# Patient Record
Sex: FEMALE | Race: BLACK OR AFRICAN AMERICAN | NOT HISPANIC OR LATINO | Employment: OTHER | URBAN - METROPOLITAN AREA
[De-identification: names, ages, dates, MRNs, and addresses within clinical notes are randomized per-mention and may not be internally consistent; named-entity substitution may affect disease eponyms.]

---

## 2023-08-16 LAB
ANION GAP IN SER/PLAS: 11 MMOL/L (ref 10–20)
CALCIUM (MG/DL) IN SER/PLAS: 8.6 MG/DL (ref 8.6–10.6)
CARBON DIOXIDE, TOTAL (MMOL/L) IN SER/PLAS: 29 MMOL/L (ref 21–32)
CHLORIDE (MMOL/L) IN SER/PLAS: 105 MMOL/L (ref 98–107)
CREATININE (MG/DL) IN SER/PLAS: 1.18 MG/DL (ref 0.5–1.05)
GFR FEMALE: 50 ML/MIN/1.73M2
GLUCOSE (MG/DL) IN SER/PLAS: 242 MG/DL (ref 74–99)
MAGNESIUM (MG/DL) IN SER/PLAS: 2.19 MG/DL (ref 1.6–2.4)
POTASSIUM (MMOL/L) IN SER/PLAS: 4.4 MMOL/L (ref 3.5–5.3)
SODIUM (MMOL/L) IN SER/PLAS: 141 MMOL/L (ref 136–145)
UREA NITROGEN (MG/DL) IN SER/PLAS: 13 MG/DL (ref 6–23)

## 2023-08-31 LAB — THYROTROPIN (MIU/L) IN SER/PLAS BY DETECTION LIMIT <= 0.05 MIU/L: 0.45 MIU/L (ref 0.44–3.98)

## 2023-10-04 ENCOUNTER — PHARMACY VISIT (OUTPATIENT)
Dept: PHARMACY | Facility: CLINIC | Age: 68
End: 2023-10-04
Payer: COMMERCIAL

## 2023-10-04 PROCEDURE — RXMED WILLOW AMBULATORY MEDICATION CHARGE

## 2023-10-04 RX ORDER — GLIPIZIDE 10 MG/1
TABLET, FILM COATED, EXTENDED RELEASE ORAL
Qty: 90 TABLET | Refills: 4 | OUTPATIENT
Start: 2023-10-03

## 2023-10-14 ENCOUNTER — PHARMACY VISIT (OUTPATIENT)
Dept: PHARMACY | Facility: CLINIC | Age: 68
End: 2023-10-14
Payer: COMMERCIAL

## 2023-10-14 PROCEDURE — RXMED WILLOW AMBULATORY MEDICATION CHARGE

## 2023-10-23 ENCOUNTER — PHARMACY VISIT (OUTPATIENT)
Dept: PHARMACY | Facility: CLINIC | Age: 68
End: 2023-10-23
Payer: COMMERCIAL

## 2023-10-23 PROCEDURE — RXMED WILLOW AMBULATORY MEDICATION CHARGE

## 2023-10-24 ENCOUNTER — PHARMACY VISIT (OUTPATIENT)
Dept: PHARMACY | Facility: CLINIC | Age: 68
End: 2023-10-24
Payer: COMMERCIAL

## 2023-10-24 PROCEDURE — RXMED WILLOW AMBULATORY MEDICATION CHARGE

## 2023-10-30 ENCOUNTER — PHARMACY VISIT (OUTPATIENT)
Dept: PHARMACY | Facility: CLINIC | Age: 68
End: 2023-10-30
Payer: COMMERCIAL

## 2023-10-30 DIAGNOSIS — I50.22 CHRONIC SYSTOLIC HEART FAILURE (MULTI): Primary | ICD-10-CM

## 2023-10-30 PROCEDURE — RXMED WILLOW AMBULATORY MEDICATION CHARGE

## 2023-10-30 RX ORDER — SACUBITRIL AND VALSARTAN 49; 51 MG/1; MG/1
1 TABLET, FILM COATED ORAL 2 TIMES DAILY
COMMUNITY
End: 2023-10-31 | Stop reason: ALTCHOICE

## 2023-10-30 NOTE — TELEPHONE ENCOUNTER
Patient called for refill of Entresto.  Documented in last note when she was seen by Swati Molina NP.  Added to med list, refill request sent to Swati.

## 2023-11-07 ENCOUNTER — PHARMACY VISIT (OUTPATIENT)
Dept: PHARMACY | Facility: CLINIC | Age: 68
End: 2023-11-07
Payer: COMMERCIAL

## 2023-11-07 PROCEDURE — RXMED WILLOW AMBULATORY MEDICATION CHARGE

## 2023-11-29 ENCOUNTER — OFFICE VISIT (OUTPATIENT)
Dept: CARDIOLOGY | Facility: CLINIC | Age: 68
End: 2023-11-29
Payer: MEDICARE

## 2023-11-29 ENCOUNTER — HOSPITAL ENCOUNTER (OUTPATIENT)
Dept: CARDIOLOGY | Facility: CLINIC | Age: 68
Discharge: HOME | End: 2023-11-29
Payer: MEDICARE

## 2023-11-29 VITALS
SYSTOLIC BLOOD PRESSURE: 129 MMHG | BODY MASS INDEX: 40.84 KG/M2 | DIASTOLIC BLOOD PRESSURE: 84 MMHG | OXYGEN SATURATION: 93 % | HEIGHT: 60 IN | HEART RATE: 77 BPM | WEIGHT: 208.03 LBS

## 2023-11-29 DIAGNOSIS — I48.0 PAROXYSMAL ATRIAL FIBRILLATION (MULTI): ICD-10-CM

## 2023-11-29 DIAGNOSIS — I47.20 VENTRICULAR TACHYARRHYTHMIA (MULTI): Primary | ICD-10-CM

## 2023-11-29 PROCEDURE — 99214 OFFICE O/P EST MOD 30 MIN: CPT | Performed by: INTERNAL MEDICINE

## 2023-11-29 PROCEDURE — 1159F MED LIST DOCD IN RCRD: CPT | Performed by: INTERNAL MEDICINE

## 2023-11-29 PROCEDURE — 93005 ELECTROCARDIOGRAM TRACING: CPT

## 2023-11-29 PROCEDURE — 1036F TOBACCO NON-USER: CPT | Performed by: INTERNAL MEDICINE

## 2023-11-29 PROCEDURE — 1126F AMNT PAIN NOTED NONE PRSNT: CPT | Performed by: INTERNAL MEDICINE

## 2023-11-29 RX ORDER — ALBUTEROL SULFATE 0.83 MG/ML
SOLUTION RESPIRATORY (INHALATION) EVERY 8 HOURS PRN
COMMUNITY
Start: 2010-06-19

## 2023-11-29 RX ORDER — FLUTICASONE PROPIONATE 220 UG/1
2 AEROSOL, METERED RESPIRATORY (INHALATION) 2 TIMES DAILY
COMMUNITY
Start: 2015-04-09

## 2023-11-29 RX ORDER — AMIODARONE HYDROCHLORIDE 200 MG/1
100 TABLET ORAL DAILY
Qty: 45 TABLET | Refills: 3 | Status: SHIPPED | OUTPATIENT
Start: 2023-11-29 | End: 2024-11-28

## 2023-11-29 RX ORDER — FLUTICASONE PROPIONATE AND SALMETEROL 500; 50 UG/1; UG/1
2 POWDER RESPIRATORY (INHALATION) DAILY PRN
COMMUNITY
Start: 2016-03-17 | End: 2024-06-02 | Stop reason: ENTERED-IN-ERROR

## 2023-11-29 RX ORDER — NAPROXEN SODIUM 220 MG/1
81 TABLET, FILM COATED ORAL DAILY
COMMUNITY
Start: 2010-06-19

## 2023-11-29 ASSESSMENT — PAIN SCALES - GENERAL: PAINLEVEL: 0-NO PAIN

## 2023-11-29 NOTE — PROGRESS NOTES
Returns for follow up visit for    Chief Complaint   Patient presents with    Cardiomyopathy     Patient returns to clinic status post initiation of Amiodarone. She states that initially she experienced increased palpitations and lightheadedness but symptoms subsided since she began taking it at night. Patient denies syncope, orthopnea and chest pain/discomfort.  ANALISA Kenney RN           History Of Present Illness:    Kenna Ravi is a 68 y.o. year old female patient       Past Medical History:  Past Medical History:   Diagnosis Date    Other conditions influencing health status     Acute Myocardial Infarction       Past Surgical History:  Past Surgical History:   Procedure Laterality Date    OTHER SURGICAL HISTORY  2018    Implantable Cardioverter-Defibrillator    TONSILLECTOMY  09/15/2013    Tonsillectomy         Family History:  No family history on file. Brother  of SCD at age 58.     Allergies:  Allergies   Allergen Reactions    Iodinated Contrast Media Anaphylaxis and Shortness of breath    Iodine Anaphylaxis    Penicillins Anaphylaxis    Enoxaparin Itching, Rash and Swelling     Skin breakdown on abdomen at injection sites    Lisinopril Cough        Outpatient Medications:  Current Outpatient Medications   Medication Instructions    albuterol 2.5 mg /3 mL (0.083 %) nebulizer solution inhalation, Every 8 hours PRN    amiodarone (Pacerone) 200 mg tablet TAKE 1/2 TABLET BY MOUTH ONCE DAILY AS DIRECTED    aspirin 81 mg, oral, Daily    atorvastatin (Lipitor) 40 mg tablet TAKE 1 TABLET BY MOUTH ONCE DAILY    fluticasone (Flovent HFA) 220 mcg/actuation inhaler 2 puffs, inhalation, 2 times daily    fluticasone propion-salmeteroL (Advair Diskus) 500-50 mcg/dose diskus inhaler 2 puffs, inhalation, Daily PRN    glipiZIDE XL (Glucotrol XL) 10 mg 24 hr tablet Take 1 tablet by mouth every day    sacubitriL-valsartan (Entresto) 49-51 mg tablet 1 tablet, oral, 2 times daily    warfarin (Coumadin) 7.5 mg tablet  "TAKE 1/2 TABLET BY MOUTH ONCE DAILY EXCEPT FOR WEDNESDAYS AND SATURDAY TAKE A FULL TABLET         Last Recorded Vitals:      3/18/2020    10:20 AM 11/5/2020    10:30 AM 5/6/2021    12:59 PM 3/31/2022    10:55 AM 7/7/2022    10:46 AM 9/20/2022    11:09 AM 11/29/2023    11:52 AM   Vitals   Systolic 122 114 105 136 132 97 129   Diastolic 78 73 66 76 73 57 84   Heart Rate 72 80 80 81 68 81 77   Height (in) 1.626 m (5' 4\") 1.626 m (5' 4\") 1.626 m (5' 4\") 1.626 m (5' 4\") 1.626 m (5' 4\") 1.626 m (5' 4\") 1.534 m (5' 0.4\")   Weight (lb) 226.38 231.19  238.56 232  208.03   BMI 38.86 kg/m2 39.68 kg/m2 39.68 kg/m2 40.95 kg/m2 39.82 kg/m2 39.82 kg/m2 40.09 kg/m2   BSA (m2) 2.16 m2 2.18 m2 2.18 m2 2.21 m2 2.18 m2 2.18 m2 2.01 m2   Visit Report       Report        Physical Exam:  Physical Exam  Constitutional:       Appearance: Normal appearance.   HENT:      Head: Normocephalic.      Nose: Nose normal.   Neck:      Vascular: No carotid bruit.   Cardiovascular:      Rate and Rhythm: Normal rate and regular rhythm.      Heart sounds: Murmur heard.      No friction rub. No gallop.      Comments: Systolic murmur LLSB  Pulmonary:      Breath sounds: Normal breath sounds.   Musculoskeletal:         General: Normal range of motion.      Right lower leg: No edema.      Left lower leg: Edema present.      Comments: Tr left LLE   Skin:     General: Skin is warm and dry.   Neurological:      General: No focal deficit present.      Mental Status: She is alert and oriented to person, place, and time.   Psychiatric:         Mood and Affect: Mood normal.         Behavior: Behavior normal.          Last Cardiology Tests:  ECG:  NSR BiV paced rhythm    Echo:    TRANSTHORACIC ECHOCARDIOGRAM REPORT        Patient Name:     SHEILA JAMES        [place] Physician:  [id number] David Guillory MD  Study Date:       7/21/2023          Referring           PAIGE CASH  Physician:  LUCILLE/PID:          [id number]           PCP:  Accession/Order#: [id number]          " [facility] Non  Location:           Invasive  YOB: 1955           Fellow:             [id number] Emery Rico MD  Gender:           F                  Nurse:              Pam Faulkner RN  Admit Date:       7/20/2023          Sonographer:        Carla Beltre [facility]  Admission Status: Inpatient -        Additional Staff:   Chase Whaley  Routine                                Cardiac Sonographer  Intern  Height:           162.56 cm          CC Report to:       [address]  Weight:           97.07 kg           Study Type:         Echocardiogram  BSA:              2.01 m2  Blood Pressure: 133 /72 mmHg     Diagnosis/ICD: I50.20-Unspecified systolic (congestive) heart failure (CHF)  Indication:    CHF, ICD shock  Procedure/CPT: Echo Limited-58173; Color Doppler-42788; Doppler Limited-71159     Patient History:  Pertinent History: HFrEF (EF 30% 2019) 2/2 ICM s/p MDT CRT-D 2013, CAD s/p PCI  in LCx in 2019, VT in 2020, APLS c/b DVT/PE, PALAK on CPAP,  COPD, ICD firing, SOB, Palpitations.     Study Detail: The following Echo studies were performed: 2D, Doppler, M-Mode and  color flow. Technically challenging study due to body habitus and  poor acoustic windows. Definity used as a contrast agent for  endocardial border definition. Total contrast used for this  procedure was 2.0 mL via IV push.        PHYSICIAN INTERPRETATION:  Left Ventricle: The left ventricular systolic function is moderately decreased, with an estimated ejection fraction of 35-40%. Wall motion is abnormal. The left ventricular cavity size is severely dilated. The left ventricular septal wall thickness is moderately increased. There is severe eccentric left ventricular hypertrophy. Left ventricular diastolic filling was indeterminate. There is no definite left ventricular thrombus visualized. The posterior LV wall is akinetic and aneurysmal.  LV Wall Scoring:  The entire lateral wall and basal and mid  inferior wall are akinetic. The entire  inferior septum is hypokinetic.     Left Atrium: The left atrium is moderate to severely dilated.  Right Ventricle: The right ventricle is mildly enlarged. There is normal right ventricular global systolic function. A device is visualized in the right ventricle.  Right Atrium: The right atrium is normal in size. There is a device visualized in the right atrium.  Aortic Valve: The aortic valve is trileaflet. There is moderate aortic valve cusp calcification. There is no evidence of aortic valve regurgitation.  Mitral Valve: The mitral valve is mildly thickened. There is mild to moderate mitral valve regurgitation.  Tricuspid Valve: The tricuspid valve is structurally normal. There is trace tricuspid regurgitation. The right ventricular systolic pressure is unable to be estimated.  Pulmonic Valve: The pulmonic valve is structurally normal. There is trace pulmonic valve regurgitation.  Pericardium: There is a trivial pericardial effusion.  Aorta: The aortic root is normal. There is mild dilatation of the ascending aorta.  Systemic Veins: The inferior vena cava appears to be of normal size. There is IVC inspiratory collapse greater than 50%.  In comparison to the previous echocardiogram(s): Compared with study from 11/16/2019, no significant change.        CONCLUSIONS:  1. Left ventricular systolic function is moderately decreased with a 35-40% estimated ejection fraction.  2. Entire lateral wall, basal and mid inferior wall, and entire inferior septum are abnormal.  3. Moderately increased left ventricular septal thickness.  4. No left ventricular thrombus visualized.  5. There is severe eccentric left ventricular hypertrophy.  6. The left atrium is moderate to severely dilated.  7. Mild to moderate mitral valve regurgitation.  8. There is moderate aortic valve cusp calcification.  9. Left ventricular cavity size is severely dilated.     QUANTITATIVE DATA SUMMARY:  2D  MEASUREMENTS:  Normal Ranges:  Ao Root d:     3.60 cm    (2.0-3.7cm)  LAs:           4.20 cm    (2.7-4.0cm)  IVSd:          1.20 cm    (0.6-1.1cm)  LVPWd:         0.60 cm    (0.6-1.1cm)  LVIDd:         7.00 cm    (3.9-5.9cm)  LVIDs:         6.20 cm  LV Mass Index: 140.2 g/m2  LV % FS        11.4 %     LA VOLUME:  Normal Ranges:  LA Vol A4C:        122.5 ml   (22+/-6mL/m2)  LA Vol A2C:        105.1 ml  LA Vol BP:         114.0 ml  LA Vol Index A4C:  60.9ml/m2  LA Vol Index A2C:  52.2 ml/m2  LA Vol Index BP:   56.7 ml/m2  LA Area A4C:       31.7 cm2  LA Area A2C:       29.5 cm2  LA Major Axis A4C: 7.0 cm  LA Major Axis A2C: 7.0 cm  LA Volume Index:   56.7 ml/m2     RA VOLUME BY A/L METHOD:  Normal Ranges:  RA Area A4C: 16.2 cm2     AORTA MEASUREMENTS:  Normal Ranges:  Asc Ao, d: 3.70 cm (2.1-3.4cm)     LV SYSTOLIC FUNCTION BY 2D PLANIMETRY (MOD):  Normal Ranges:  EF-A4C View: 37.0 % (>=55%)  EF-A2C View: 36.2 %  EF-Biplane:  37.3 %     MITRAL INSUFFICIENCY:  Normal Ranges:  MR VTI:  221.00 cm  MR Vmax: 545.00 cm/s     AORTIC VALVE:  Normal Ranges:  LVOT Diameter: 2.20 cm (1.8-2.4cm)        RIGHT VENTRICLE:  RV Basal 4.30 cm  RV Mid   2.30 cm  RV Major 7.4 cm  TAPSE:   22.6 mm     TRICUSPID VALVE/RVSP:  Normal Ranges:  IVC Diam: 1.04 cm        [id number] David Guillory MD  Electronically signed on 7/21/2023 at 11:21:12 AM       Cardiac Device Check 9/26/23 :  Battery status 1 yr 10 mos projected  Effective BiV pacing 98%  No sig A or V arrhythmias.    LFTs 7/2023 normal  TSH 8/2023   0.45       Assessment/Plan   Problem List Items Addressed This Visit    None  Visit Diagnoses         Codes    Ventricular tachyarrhythmia (CMS/HCC)    -  Primary I47.20    Relevant Medications    amiodarone (Pacerone) 100 mg tablet    Other Relevant Orders    ECG 12 lead (Clinic Performed)    Comprehensive metabolic panel    Paroxysmal atrial fibrillation (CMS/HCC)     I48.0        Continue amiodarone - no further VT episodes. No AF.  Change  formulation to 100 mg at night and continue warfarin for stroke risk reduction.  Return in May for in clinic check.  She will have CMP done with next blood draw.  Remote every 3 months for CRT check.     Peggy Rocha MD

## 2023-11-29 NOTE — PATIENT INSTRUCTIONS
It was nice to see you today!  We changed your prescription to amiodarone 100 mg tablets so you will take one a night. (Pacerone)  Please have the blood workdone when you re able for us to check your liver function on the amiodarone.  Your next follow up for the amiodarone should be in May 2023 -  with Sujey Guillory CNP.   Remote device check is do at end of December and is remote.    Schedule hte in clinic device check for May as well.

## 2023-11-30 ENCOUNTER — TELEPHONE (OUTPATIENT)
Dept: CARDIOLOGY | Facility: CLINIC | Age: 68
End: 2023-11-30
Payer: MEDICARE

## 2023-11-30 NOTE — TELEPHONE ENCOUNTER
Spoke with Kenna Ravi and informed her 100 mg dose of amiodarone is not covered by her insurance so Dr. Rocha renewed the Rx at the 200 mg dose and she will have to continue to cut them in half. Patoent expressed understanding.

## 2023-12-04 PROBLEM — R06.5 XEROSTOMIA DUE TO MOUTH BREATHING: Status: ACTIVE | Noted: 2023-12-04

## 2023-12-04 PROBLEM — E78.5 HYPERLIPIDEMIA: Status: ACTIVE | Noted: 2023-12-04

## 2023-12-04 PROBLEM — O03.9 ABORTION (HHS-HCC): Status: ACTIVE | Noted: 2023-12-04

## 2023-12-04 PROBLEM — E11.9 DIABETES MELLITUS (MULTI): Status: ACTIVE | Noted: 2023-12-04

## 2023-12-04 PROBLEM — I10 ESSENTIAL HYPERTENSION: Status: ACTIVE | Noted: 2023-12-04

## 2023-12-04 PROBLEM — N28.9 ACUTE RENAL INSUFFICIENCY: Status: ACTIVE | Noted: 2023-12-04

## 2023-12-04 PROBLEM — I42.9 CARDIOMYOPATHY (MULTI): Status: ACTIVE | Noted: 2023-12-04

## 2023-12-04 PROBLEM — Z95.5 PRESENCE OF STENT IN CORONARY ARTERY: Status: ACTIVE | Noted: 2023-12-04

## 2023-12-04 PROBLEM — R00.2 PALPITATIONS: Status: ACTIVE | Noted: 2023-12-04

## 2023-12-04 PROBLEM — Z95.810 PRESENCE OF AUTOMATIC (IMPLANTABLE) CARDIAC DEFIBRILLATOR: Status: ACTIVE | Noted: 2023-04-06

## 2023-12-04 PROBLEM — E03.9 HYPOTHYROIDISM: Status: ACTIVE | Noted: 2023-12-04

## 2023-12-04 PROBLEM — J44.9 COPD (CHRONIC OBSTRUCTIVE PULMONARY DISEASE) (MULTI): Status: ACTIVE | Noted: 2023-12-04

## 2023-12-04 PROBLEM — I82.409 DEEP VEIN THROMBOSIS OF LOWER EXTREMITY (MULTI): Status: ACTIVE | Noted: 2023-12-04

## 2023-12-04 PROBLEM — I47.20 VENTRICULAR TACHYCARDIA (MULTI): Status: ACTIVE | Noted: 2023-12-04

## 2023-12-04 PROBLEM — I25.10 CORONARY ATHEROSCLEROSIS OF NATIVE CORONARY ARTERY: Status: ACTIVE | Noted: 2023-12-04

## 2023-12-04 PROBLEM — I50.22 CHRONIC SYSTOLIC CONGESTIVE HEART FAILURE (MULTI): Status: ACTIVE | Noted: 2023-12-04

## 2023-12-04 PROBLEM — I26.99 PULMONARY ARTERY THROMBOSIS (MULTI): Status: ACTIVE | Noted: 2023-12-04

## 2023-12-04 PROBLEM — I21.4 NSTEMI (NON-ST ELEVATED MYOCARDIAL INFARCTION) (MULTI): Status: ACTIVE | Noted: 2018-08-23

## 2023-12-04 PROBLEM — D64.9 ANEMIA: Status: ACTIVE | Noted: 2023-12-04

## 2023-12-04 PROBLEM — D68.61 ANTIPHOSPHOLIPID ANTIBODY SYNDROME (MULTI): Status: ACTIVE | Noted: 2023-12-04

## 2023-12-04 PROBLEM — G47.33 OBSTRUCTIVE SLEEP APNEA: Status: ACTIVE | Noted: 2023-12-04

## 2023-12-04 PROBLEM — N18.9 CKD (CHRONIC KIDNEY DISEASE): Status: ACTIVE | Noted: 2023-12-04

## 2023-12-04 PROBLEM — R07.89 ATYPICAL CHEST PAIN: Status: ACTIVE | Noted: 2023-12-04

## 2023-12-04 PROBLEM — K11.7 XEROSTOMIA DUE TO MOUTH BREATHING: Status: ACTIVE | Noted: 2023-12-04

## 2023-12-05 ENCOUNTER — OFFICE VISIT (OUTPATIENT)
Dept: CARDIOLOGY | Facility: HOSPITAL | Age: 68
End: 2023-12-05
Payer: MEDICARE

## 2023-12-05 ENCOUNTER — PHARMACY VISIT (OUTPATIENT)
Dept: PHARMACY | Facility: CLINIC | Age: 68
End: 2023-12-05
Payer: COMMERCIAL

## 2023-12-05 VITALS
BODY MASS INDEX: 36.26 KG/M2 | SYSTOLIC BLOOD PRESSURE: 147 MMHG | DIASTOLIC BLOOD PRESSURE: 82 MMHG | OXYGEN SATURATION: 91 % | HEART RATE: 97 BPM | HEIGHT: 64 IN | WEIGHT: 212.4 LBS

## 2023-12-05 DIAGNOSIS — I50.22 CHRONIC SYSTOLIC CONGESTIVE HEART FAILURE (MULTI): Primary | ICD-10-CM

## 2023-12-05 DIAGNOSIS — E78.5 HYPERLIPIDEMIA, UNSPECIFIED HYPERLIPIDEMIA TYPE: Primary | ICD-10-CM

## 2023-12-05 LAB
ATRIAL RATE: 77 BPM
P AXIS: 67 DEGREES
P OFFSET: 143 MS
P ONSET: 123 MS
PR INTERVAL: 136 MS
Q ONSET: 176 MS
QRS COUNT: 13 BEATS
QRS DURATION: 180 MS
QT INTERVAL: 508 MS
QTC CALCULATION(BAZETT): 574 MS
QTC FREDERICIA: 552 MS
R AXIS: -80 DEGREES
T AXIS: 95 DEGREES
T OFFSET: 430 MS
VENTRICULAR RATE: 77 BPM

## 2023-12-05 PROCEDURE — 3066F NEPHROPATHY DOC TX: CPT | Performed by: INTERNAL MEDICINE

## 2023-12-05 PROCEDURE — 3077F SYST BP >= 140 MM HG: CPT | Performed by: INTERNAL MEDICINE

## 2023-12-05 PROCEDURE — 1126F AMNT PAIN NOTED NONE PRSNT: CPT | Performed by: INTERNAL MEDICINE

## 2023-12-05 PROCEDURE — 99215 OFFICE O/P EST HI 40 MIN: CPT | Performed by: INTERNAL MEDICINE

## 2023-12-05 PROCEDURE — RXMED WILLOW AMBULATORY MEDICATION CHARGE

## 2023-12-05 PROCEDURE — 1159F MED LIST DOCD IN RCRD: CPT | Performed by: INTERNAL MEDICINE

## 2023-12-05 PROCEDURE — 3079F DIAST BP 80-89 MM HG: CPT | Performed by: INTERNAL MEDICINE

## 2023-12-05 PROCEDURE — 3052F HG A1C>EQUAL 8.0%<EQUAL 9.0%: CPT | Performed by: INTERNAL MEDICINE

## 2023-12-05 PROCEDURE — 1036F TOBACCO NON-USER: CPT | Performed by: INTERNAL MEDICINE

## 2023-12-05 PROCEDURE — 1160F RVW MEDS BY RX/DR IN RCRD: CPT | Performed by: INTERNAL MEDICINE

## 2023-12-05 RX ORDER — FUROSEMIDE 40 MG/1
40 TABLET ORAL
COMMUNITY
End: 2023-12-13

## 2023-12-05 RX ORDER — DAPAGLIFLOZIN 5 MG/1
5 TABLET, FILM COATED ORAL DAILY
Qty: 90 EACH | Refills: 3 | Status: SHIPPED | OUTPATIENT
Start: 2023-12-05 | End: 2023-12-19 | Stop reason: SDUPTHER

## 2023-12-05 RX ORDER — ATORVASTATIN CALCIUM 40 MG/1
40 TABLET, FILM COATED ORAL DAILY
Qty: 30 TABLET | Refills: 11 | Status: SHIPPED | OUTPATIENT
Start: 2023-12-05 | End: 2024-12-04

## 2023-12-05 RX ORDER — CARVEDILOL 3.12 MG/1
3.12 TABLET ORAL
Qty: 180 TABLET | Refills: 2 | Status: SHIPPED | OUTPATIENT
Start: 2023-12-05 | End: 2024-09-01

## 2023-12-05 ASSESSMENT — PATIENT HEALTH QUESTIONNAIRE - PHQ9
1. LITTLE INTEREST OR PLEASURE IN DOING THINGS: NOT AT ALL
SUM OF ALL RESPONSES TO PHQ9 QUESTIONS 1 AND 2: 0
2. FEELING DOWN, DEPRESSED OR HOPELESS: NOT AT ALL

## 2023-12-05 ASSESSMENT — PAIN SCALES - GENERAL: PAINLEVEL: 0-NO PAIN

## 2023-12-05 NOTE — PROGRESS NOTES
Chief Complaint    SHEILA JAMES is here for a follow up visit     History of Present Illness  68 year old woman with the PMH significant for  HFrEF , CAD s/p PCI to her Lcx in 2019 , s/p CRT in 2019 , antiphospholipid Ab syndrome c/b DVT/PE s/p [place] filter '98, hypothyroid, PALAK on CPAP, CKD, NVST and VT who I last saw in 2022 but who has been following with Swati Molina regularly .   She was admitted in August 2023 with Vfib and ICD firing . During this admission, echo was stable from last with LVEF 30% and V/Q scan negative for pulmonary embolism. Patient was unable to complete cardiac MRI due to claustrophobia. [facility] on 7/31/23, which showed non-obstructive coronary artery disease.   She is here for a follow up visit   Overall doing well     [facility] ( August 2023 )   Non obstructive CAD     Echocardiogram ( July 2023)   1. Left ventricular systolic function is moderately decreased with a 35-40% estimated ejection fraction.  2. Entire lateral wall, basal and mid inferior wall, and entire inferior septum are abnormal.  3. Moderately increased left ventricular septal thickness.  4. No left ventricular thrombus visualized.  5. There is severe eccentric left ventricular hypertrophy.  6. The left atrium is moderate to severely dilated.  7. Mild to moderate mitral valve regurgitation.  8. There is moderate aortic valve cusp calcification.  9. Left ventricular cavity size is severely dilated.    Vitals:    12/05/23 0931   BP: 147/82   Pulse: 97   SpO2: 91%        On Physical exam   Gen : NAD , Aox3  HEENT : JVP at around 1 cm above the clavicle   Heart : regular , no mumurs   Lungs :Clear resonant , normal air entry bilaterally  Abdomen : soft , non tender   Ext : warm , well perfused ,+1 pitting edema bilaterally     Current Outpatient Medications   Medication Instructions    albuterol 2.5 mg /3 mL (0.083 %) nebulizer solution inhalation, Every 8 hours PRN    amiodarone (PACERONE) 100 mg, oral, Daily    aspirin 81 mg,  oral, Daily    atorvastatin (LIPITOR) 40 mg, oral, Daily    carvedilol (COREG) 3.125 mg, oral, 2 times daily with meals    dapagliflozin propanediol (FARXIGA) 5 mg, oral, Daily    fluticasone (Flovent HFA) 220 mcg/actuation inhaler 2 puffs, inhalation, 2 times daily    fluticasone propion-salmeteroL (Advair Diskus) 500-50 mcg/dose diskus inhaler 2 puffs, inhalation, Daily PRN    furosemide (LASIX) 40 mg, oral, Daily    glipiZIDE XL (Glucotrol XL) 10 mg 24 hr tablet Take 1 tablet by mouth every day    sacubitriL-valsartan (Entresto) 49-51 mg tablet 1 tablet, oral, 2 times daily    warfarin (Coumadin) 7.5 mg tablet TAKE 1/2 TABLET BY MOUTH ONCE DAILY EXCEPT FOR WEDNESDAYS AND SATURDAY TAKE A FULL TABLET            A/P  68 year old woman with the PMH significant for  HFrEF , CAD s/p PCI to her Lcx in 2019 , s/p CRT in 2019 , antiphospholipid Ab syndrome c/b DVT/PE s/p [place] filter '98, hypothyroid, PALAK on CPAP, CKD, NVST and VT who I last saw in 2022 but who has been following with Swati Molina regularly .   She was admitted in August 2023 with Vfib and ICD firing . During this admission, echo was stable from last with LVEF 30% and V/Q scan negative for pulmonary embolism. Patient was unable to complete cardiac MRI due to claustrophobia. [facility] on 7/31/23, which showed non-obstructive coronary artery disease.   She is here for a follow up visit     #HFrEF  Will resume carvedilol at 3.125 mg BID   Add jardiance 5 mg daily   Mildly hypervolemic but has not taken lasix yet , will reassess her volume after initiation of jardiance     #history of VT and Vfib with ICD firing   On amidarone 100 mg daily   Follows with Dr Rocha     #CAD s/p PCI to the Lcx in 2019   Non obstructive CAD on her most recent LHC from August 2023   Continue ASA and Statin     #PE and DVT s/p IVC filter in the setting of antiphospholipid antibodies   Continue anticoagulation     Obtain a renal function panel and follow up with Swati Molina in 2  weeks

## 2023-12-05 NOTE — PATIENT INSTRUCTIONS
To reach Dr. Chery's office please call [phone] ([place]). Fax [phone]. Call [phone] to schedule an appointment. You may also contact the HF RNs at [email or web address]  (Please include your name and date of birth)    START Carvedilol 3.125mg twice daily  START Farxiga 5mg Daily- please monitor your blood sugar   Please schedule a follow up with Swati Molina in 2 weeks   Please have lab work before appointment with Swati

## 2023-12-06 ENCOUNTER — PHARMACY VISIT (OUTPATIENT)
Dept: PHARMACY | Facility: CLINIC | Age: 68
End: 2023-12-06

## 2023-12-12 DIAGNOSIS — I50.22 CHRONIC SYSTOLIC CONGESTIVE HEART FAILURE (MULTI): Primary | ICD-10-CM

## 2023-12-12 RX ORDER — OMEPRAZOLE 20 MG/1
20 CAPSULE, DELAYED RELEASE ORAL DAILY
Qty: 90 CAPSULE | Refills: 0 | OUTPATIENT
Start: 2023-12-12

## 2023-12-13 RX ORDER — FUROSEMIDE 40 MG/1
40 TABLET ORAL DAILY
Qty: 90 TABLET | Refills: 3 | Status: SHIPPED | OUTPATIENT
Start: 2023-12-13 | End: 2024-12-12

## 2023-12-18 ENCOUNTER — OFFICE VISIT (OUTPATIENT)
Dept: CARDIOLOGY | Facility: HOSPITAL | Age: 68
End: 2023-12-18
Payer: MEDICARE

## 2023-12-18 VITALS
BODY MASS INDEX: 36.47 KG/M2 | DIASTOLIC BLOOD PRESSURE: 89 MMHG | WEIGHT: 213.6 LBS | HEART RATE: 85 BPM | HEIGHT: 64 IN | SYSTOLIC BLOOD PRESSURE: 148 MMHG | OXYGEN SATURATION: 93 %

## 2023-12-18 DIAGNOSIS — I50.22 CHRONIC SYSTOLIC CONGESTIVE HEART FAILURE (MULTI): ICD-10-CM

## 2023-12-18 PROCEDURE — 99214 OFFICE O/P EST MOD 30 MIN: CPT | Performed by: NURSE PRACTITIONER

## 2023-12-18 PROCEDURE — 3079F DIAST BP 80-89 MM HG: CPT | Performed by: NURSE PRACTITIONER

## 2023-12-18 PROCEDURE — 3052F HG A1C>EQUAL 8.0%<EQUAL 9.0%: CPT | Performed by: NURSE PRACTITIONER

## 2023-12-18 PROCEDURE — 1159F MED LIST DOCD IN RCRD: CPT | Performed by: NURSE PRACTITIONER

## 2023-12-18 PROCEDURE — 3077F SYST BP >= 140 MM HG: CPT | Performed by: NURSE PRACTITIONER

## 2023-12-18 PROCEDURE — 1160F RVW MEDS BY RX/DR IN RCRD: CPT | Performed by: NURSE PRACTITIONER

## 2023-12-18 PROCEDURE — 1126F AMNT PAIN NOTED NONE PRSNT: CPT | Performed by: NURSE PRACTITIONER

## 2023-12-18 PROCEDURE — 3066F NEPHROPATHY DOC TX: CPT | Performed by: NURSE PRACTITIONER

## 2023-12-18 PROCEDURE — 1036F TOBACCO NON-USER: CPT | Performed by: NURSE PRACTITIONER

## 2023-12-18 ASSESSMENT — ENCOUNTER SYMPTOMS
LIGHT-HEADEDNESS: 0
SHORTNESS OF BREATH: 0
IRREGULAR HEARTBEAT: 0
BRUISES/BLEEDS EASILY: 0
HEMATOCHEZIA: 0
WEIGHT LOSS: 0
ANOREXIA: 0
DIARRHEA: 0
ORTHOPNEA: 0
NEAR-SYNCOPE: 0
DECREASED APPETITE: 0
PND: 0
SLEEP DISTURBANCES DUE TO BREATHING: 0
SYNCOPE: 0
NAUSEA: 0
PALPITATIONS: 0
VOMITING: 0
DYSPNEA ON EXERTION: 0
WEIGHT GAIN: 0
DIZZINESS: 0
HEMATURIA: 0

## 2023-12-18 ASSESSMENT — PATIENT HEALTH QUESTIONNAIRE - PHQ9
2. FEELING DOWN, DEPRESSED OR HOPELESS: NOT AT ALL
SUM OF ALL RESPONSES TO PHQ9 QUESTIONS 1 AND 2: 0
1. LITTLE INTEREST OR PLEASURE IN DOING THINGS: NOT AT ALL

## 2023-12-18 ASSESSMENT — PAIN SCALES - GENERAL: PAINLEVEL: 0-NO PAIN

## 2023-12-18 NOTE — PATIENT INSTRUCTIONS
& start Farxiga 5mg every day from [facility].   Please have labs drawn in 7-10 days after you start medication change. We will call you with any abnormal results.   You were previously referred for cardiac rehabilitation.  If you are not contacted to schedule appointment in the next 2-3 business days, please call to schedule the appointment: [place]: [phone]  Continue current medications as ordered.   Call [phone] to schedule 1 month follow up with Swati Molina.

## 2023-12-18 NOTE — PROGRESS NOTES
"Subjective     Chief Complaint:  69yo patient of Dr. Jun Rascon here for 2 week follow up for heart failure.     HPI  Most recent visit 12/5/23, start Carvedilol 3.125mg BID and Farxiga 5mg every day. Order for RFP/BNP/INR.     PMHx: HTN with severe LVH, HLD, DM2, HFrEF 2/2 ICM (initially NICM but had MI 20 yrs ago) s/p MDT CRT-D 2/11/29, CAD/ remote MI '98, [facility] 7/2019 s/p PCI to LCx, COPD, antiphospholipid Ab syndrome c/b DVT/PE s/p [place] filter '98, hypothyroid, PALAK on CPAP, CKD, NVST & MMVT treated with ATP  Social hx: Former smoker, quit 2010. Denies EtoH/illicit drug use.      Ambulates with cane. Denies chest pain. Denies palpitations. Denies SOB on exertion. Activity limited by BLE weakness & balance issues per patient.  Denies orthopnea/PND. Adherent w/CPAP. Denies lightheadedness, dizziness, and syncope. Denies edema. Medication nonadherent- has not received Farxiga. Decreased appetite. Denies N/V/D. Home -120s/60-70s. Weight stable.     Review of Systems   Constitutional: Negative for decreased appetite, weight gain and weight loss.   HENT:  Negative for nosebleeds.    Cardiovascular:  Negative for chest pain, dyspnea on exertion, irregular heartbeat, leg swelling, near-syncope, orthopnea, palpitations, paroxysmal nocturnal dyspnea and syncope.   Respiratory:  Negative for shortness of breath and sleep disturbances due to breathing.    Hematologic/Lymphatic: Negative for bleeding problem. Does not bruise/bleed easily.   Musculoskeletal:  Positive for muscle weakness.   Gastrointestinal:  Negative for anorexia, diarrhea, hematochezia, nausea and vomiting.   Genitourinary:  Negative for hematuria.   Neurological:  Negative for dizziness and light-headedness.       Objective   Visit Vitals  /89 (BP Location: Left arm, Patient Position: Sitting, BP Cuff Size: Adult)   Pulse 85   Ht 1.626 m (5' 4\")   Wt 96.9 kg (213 lb 9.6 oz)   SpO2 93%   BMI 36.66 kg/m²   Smoking Status Former   BSA 2.09 m² "       Vitals reviewed.   Constitutional:       Appearance: Obese.   Neck:      Vascular: No hepatojugular reflux or JVD. JVD normal.   Pulmonary:      Effort: Pulmonary effort is normal.      Breath sounds: Normal breath sounds.   Cardiovascular:      Normal rate. Regular rhythm.      Murmurs: There is no murmur.      No gallop.  No click. No rub.   Edema:     Peripheral edema present.     Ankle: bilateral trace edema of the ankle.     Feet: bilateral trace edema of the feet.  Abdominal:      General: There is no distension.   Skin:     General: Skin is warm and dry.   Neurological:      Mental Status: Alert and oriented to person, place and time.       Lab Review:   Lab Results   Component Value Date     08/16/2023    K 4.4 08/16/2023     08/16/2023    CO2 29 08/16/2023    BUN 13 08/16/2023    CREATININE 1.18 (H) 08/16/2023    GLUCOSE 242 (H) 08/16/2023    CALCIUM 8.6 08/16/2023     Lab Results   Component Value Date    WBC 8.0 08/01/2023    HGB 11.1 (L) 08/01/2023    HCT 36.3 08/01/2023    MCV 87 08/01/2023     08/01/2023       Current Outpatient Medications:     albuterol 2.5 mg /3 mL (0.083 %) nebulizer solution, Inhale every 8 hours if needed for wheezing., Disp: , Rfl:     amiodarone (Pacerone) 200 mg tablet, Take 0.5 tablets (100 mg) by mouth once daily., Disp: 45 tablet, Rfl: 3    aspirin 81 mg chewable tablet, Chew 1 tablet (81 mg) once daily., Disp: , Rfl:     atorvastatin (Lipitor) 40 mg tablet, Take 1 tablet (40 mg) by mouth once daily., Disp: 30 tablet, Rfl: 11    carvedilol (Coreg) 3.125 mg tablet, Take 1 tablet (3.125 mg) by mouth 2 times a day with meals., Disp: 180 tablet, Rfl: 2    dapagliflozin propanediol (Farxiga) 5 mg, Take 1 tablet (5 mg) by mouth once daily., Disp: 90 each, Rfl: 3    fluticasone (Flovent HFA) 220 mcg/actuation inhaler, Inhale 2 puffs 2 times a day., Disp: , Rfl:     fluticasone propion-salmeteroL (Advair Diskus) 500-50 mcg/dose diskus inhaler, Inhale 2  puffs once daily as needed., Disp: , Rfl:     furosemide (Lasix) 40 mg tablet, Take 1 tablet (40 mg) by mouth once daily., Disp: 90 tablet, Rfl: 3    glipiZIDE XL (Glucotrol XL) 10 mg 24 hr tablet, Take 1 tablet by mouth every day, Disp: 90 tablet, Rfl: 4    sacubitriL-valsartan (Entresto) 49-51 mg tablet, Take 1 tablet by mouth 2 times a day., Disp: 180 tablet, Rfl: 0    warfarin (Coumadin) 7.5 mg tablet, TAKE 1/2 TABLET BY MOUTH ONCE DAILY EXCEPT FOR WEDNESDAYS AND SATURDAY TAKE A FULL TABLET, Disp: 30 tablet, Rfl: 2      Assessment/Plan   Systolic Heart Failure  Trace nonpitting BLE edema.  Appears euvolemic and normotensive on exam today. TTE 7/2023 LVEF 35-40% w/severely dilated LV, severe LVH. [facility] 7/2023 mid RCA w/40% stenosis  & 30% LAD stenosis w/negative FFR, no PCI. NYHA Class III Stage C HFrEF 2/2 NICM s/p CRT-D. (Not a candidate for CMEMS 2/2 DVT/PE hx.) Continue Carvediol 3.125mg BID, Entresto 49-51mg BID (allergy to ACEI), Farxiga 5mg every day (requesting transfer of Rx to [facility]), and Lasix 40mg QD. No MRA 2/2 intermittent hyperkalemia (5.4). Previous order for RFP/BNP in 7-10 days. Previously referred to cardiac rehab on 12/5/23.      VT  Asymptomatic. Continue Amiodarone 100mg every day. Denies shocks. Follows w/EP.      CAD  Denies active s/s of ACS. [facility] 2023 nonobstructive CAD. Continue Aspirin 81mg QD and Atorvastatin 40mg QHS.     Antiphospholipid c/b DVT/PE s/p IVC filter  Continue Warfarin. Denies active s/s of bleeding. [facility]s w/[facility]. Order for INR.

## 2023-12-19 PROCEDURE — RXMED WILLOW AMBULATORY MEDICATION CHARGE

## 2023-12-19 RX ORDER — DAPAGLIFLOZIN 5 MG/1
5 TABLET, FILM COATED ORAL DAILY
Qty: 90 TABLET | Refills: 3 | Status: SHIPPED | OUTPATIENT
Start: 2023-12-19 | End: 2024-12-18

## 2023-12-20 ENCOUNTER — LAB (OUTPATIENT)
Dept: LAB | Facility: LAB | Age: 68
End: 2023-12-20
Payer: MEDICARE

## 2023-12-20 ENCOUNTER — PHARMACY VISIT (OUTPATIENT)
Dept: PHARMACY | Facility: CLINIC | Age: 68
End: 2023-12-20
Payer: COMMERCIAL

## 2023-12-20 DIAGNOSIS — I50.22 CHRONIC SYSTOLIC CONGESTIVE HEART FAILURE (MULTI): ICD-10-CM

## 2023-12-20 DIAGNOSIS — I47.20 VENTRICULAR TACHYARRHYTHMIA (MULTI): ICD-10-CM

## 2023-12-20 LAB
ALBUMIN SERPL BCP-MCNC: 3.4 G/DL (ref 3.4–5)
ALP SERPL-CCNC: 72 U/L (ref 33–136)
ALT SERPL W P-5'-P-CCNC: 7 U/L (ref 7–45)
ANION GAP SERPL CALC-SCNC: 11 MMOL/L (ref 10–20)
AST SERPL W P-5'-P-CCNC: 11 U/L (ref 9–39)
BILIRUB SERPL-MCNC: 0.4 MG/DL (ref 0–1.2)
BUN SERPL-MCNC: 13 MG/DL (ref 6–23)
CALCIUM SERPL-MCNC: 8.1 MG/DL (ref 8.6–10.6)
CHLORIDE SERPL-SCNC: 104 MMOL/L (ref 98–107)
CO2 SERPL-SCNC: 30 MMOL/L (ref 21–32)
CREAT SERPL-MCNC: 1.56 MG/DL (ref 0.5–1.05)
GFR SERPL CREATININE-BSD FRML MDRD: 36 ML/MIN/1.73M*2
GLUCOSE SERPL-MCNC: 168 MG/DL (ref 74–99)
PHOSPHATE SERPL-MCNC: 3 MG/DL (ref 2.5–4.9)
POTASSIUM SERPL-SCNC: 4.5 MMOL/L (ref 3.5–5.3)
PROT SERPL-MCNC: 6.1 G/DL (ref 6.4–8.2)
SODIUM SERPL-SCNC: 140 MMOL/L (ref 136–145)

## 2023-12-20 PROCEDURE — 80053 COMPREHEN METABOLIC PANEL: CPT

## 2023-12-20 PROCEDURE — 84100 ASSAY OF PHOSPHORUS: CPT

## 2023-12-20 PROCEDURE — 83880 ASSAY OF NATRIURETIC PEPTIDE: CPT

## 2023-12-20 PROCEDURE — 36415 COLL VENOUS BLD VENIPUNCTURE: CPT

## 2023-12-22 LAB — BNP SERPL-MCNC: 238 PG/ML (ref 0–99)

## 2023-12-27 PROCEDURE — RXMED WILLOW AMBULATORY MEDICATION CHARGE

## 2023-12-28 ENCOUNTER — TELEPHONE (OUTPATIENT)
Dept: CARDIOLOGY | Facility: CLINIC | Age: 68
End: 2023-12-28
Payer: MEDICARE

## 2023-12-28 ENCOUNTER — PHARMACY VISIT (OUTPATIENT)
Dept: PHARMACY | Facility: CLINIC | Age: 68
End: 2023-12-28
Payer: COMMERCIAL

## 2023-12-28 NOTE — TELEPHONE ENCOUNTER
Spoke with patient and informed her that Dr. Rocha reviewed the results of her blood work and they are acceptable. Patient verbalized understanding.

## 2024-01-23 DIAGNOSIS — I50.22 CHRONIC SYSTOLIC HEART FAILURE (MULTI): ICD-10-CM

## 2024-01-26 PROCEDURE — RXMED WILLOW AMBULATORY MEDICATION CHARGE

## 2024-01-27 ENCOUNTER — PHARMACY VISIT (OUTPATIENT)
Dept: PHARMACY | Facility: CLINIC | Age: 69
End: 2024-01-27
Payer: COMMERCIAL

## 2024-01-30 RX ORDER — SACUBITRIL AND VALSARTAN 49; 51 MG/1; MG/1
1 TABLET, FILM COATED ORAL 2 TIMES DAILY
Qty: 180 TABLET | Refills: 0 | Status: SHIPPED | OUTPATIENT
Start: 2024-01-30 | End: 2024-04-02 | Stop reason: SDUPTHER

## 2024-01-31 PROCEDURE — RXMED WILLOW AMBULATORY MEDICATION CHARGE

## 2024-02-01 ENCOUNTER — PHARMACY VISIT (OUTPATIENT)
Dept: PHARMACY | Facility: CLINIC | Age: 69
End: 2024-02-01
Payer: COMMERCIAL

## 2024-02-10 PROCEDURE — RXMED WILLOW AMBULATORY MEDICATION CHARGE

## 2024-02-12 ENCOUNTER — PHARMACY VISIT (OUTPATIENT)
Dept: PHARMACY | Facility: CLINIC | Age: 69
End: 2024-02-12
Payer: COMMERCIAL

## 2024-02-13 ENCOUNTER — PHARMACY VISIT (OUTPATIENT)
Dept: PHARMACY | Facility: CLINIC | Age: 69
End: 2024-02-13

## 2024-02-14 RX ORDER — WARFARIN 7.5 MG/1
TABLET ORAL
Qty: 30 TABLET | Refills: 2 | OUTPATIENT
Start: 2024-02-14 | End: 2025-01-01

## 2024-02-15 RX ORDER — WARFARIN 7.5 MG/1
TABLET ORAL
Qty: 30 TABLET | Refills: 2 | Status: CANCELLED | OUTPATIENT
Start: 2024-02-15 | End: 2025-02-14

## 2024-02-29 PROCEDURE — RXMED WILLOW AMBULATORY MEDICATION CHARGE

## 2024-03-01 ENCOUNTER — PHARMACY VISIT (OUTPATIENT)
Dept: PHARMACY | Facility: CLINIC | Age: 69
End: 2024-03-01
Payer: COMMERCIAL

## 2024-03-01 PROCEDURE — RXMED WILLOW AMBULATORY MEDICATION CHARGE

## 2024-03-01 RX ORDER — WARFARIN 7.5 MG/1
TABLET ORAL
Qty: 30 TABLET | Refills: 11 | OUTPATIENT
Start: 2024-03-01 | End: 2024-06-03 | Stop reason: HOSPADM

## 2024-03-04 ENCOUNTER — PHARMACY VISIT (OUTPATIENT)
Dept: PHARMACY | Facility: CLINIC | Age: 69
End: 2024-03-04
Payer: COMMERCIAL

## 2024-03-21 ENCOUNTER — HOSPITAL ENCOUNTER (OUTPATIENT)
Dept: RADIOLOGY | Facility: CLINIC | Age: 69
Discharge: HOME | End: 2024-03-21
Payer: MEDICARE

## 2024-03-21 DIAGNOSIS — Z12.31 ENCOUNTER FOR SCREENING MAMMOGRAM FOR MALIGNANT NEOPLASM OF BREAST: ICD-10-CM

## 2024-03-21 PROCEDURE — 77063 BREAST TOMOSYNTHESIS BI: CPT | Performed by: RADIOLOGY

## 2024-03-21 PROCEDURE — 77067 SCR MAMMO BI INCL CAD: CPT | Performed by: RADIOLOGY

## 2024-03-21 PROCEDURE — 77067 SCR MAMMO BI INCL CAD: CPT

## 2024-03-26 PROCEDURE — RXMED WILLOW AMBULATORY MEDICATION CHARGE

## 2024-03-27 ENCOUNTER — PHARMACY VISIT (OUTPATIENT)
Dept: PHARMACY | Facility: CLINIC | Age: 69
End: 2024-03-27
Payer: COMMERCIAL

## 2024-04-02 ENCOUNTER — TELEPHONE (OUTPATIENT)
Dept: CARDIOLOGY | Facility: HOSPITAL | Age: 69
End: 2024-04-02

## 2024-04-02 DIAGNOSIS — I50.22 CHRONIC SYSTOLIC HEART FAILURE (MULTI): ICD-10-CM

## 2024-04-02 RX ORDER — SACUBITRIL AND VALSARTAN 49; 51 MG/1; MG/1
1 TABLET, FILM COATED ORAL 2 TIMES DAILY
Qty: 180 TABLET | Refills: 2 | Status: SHIPPED | OUTPATIENT
Start: 2024-04-02 | End: 2024-12-28

## 2024-04-02 NOTE — TELEPHONE ENCOUNTER
CRM # [id number]  Owner: None  Status: Unresolved  Priority: High Created on: 04/01/2024 02:15 PM By: Montse Welch     Primary Information    Source   Kenna Ravi (Patient)    Subject   Kenna Ravi (Patient)    Topic   Information Request - Prescription Refill FAQ      Summary   pt needs a prescription refill on  sacubitriL-valsartan (Entresto) 49-51 mg tablet   Communication   Prescription refill needed

## 2024-04-02 NOTE — TELEPHONE ENCOUNTER
CRM # [id number]  Owner: None  Status: Unresolved  Priority: High Created on: 04/01/2024 02:11 PM By: Montse Welch     Primary Information    Source   Kenna Ravi (Patient)    Subject   Kenna Ravi (Patient)    Topic   Information Request - Prescription Refill FAQ      Summary   patient needs a prescription refill on dapagliflozin propanediol (Farxiga) 5 mg   Communication   Pt needs a prescription refill

## 2024-04-03 ENCOUNTER — PHARMACY VISIT (OUTPATIENT)
Dept: PHARMACY | Facility: CLINIC | Age: 69
End: 2024-04-03
Payer: COMMERCIAL

## 2024-04-03 PROCEDURE — RXMED WILLOW AMBULATORY MEDICATION CHARGE

## 2024-04-08 PROCEDURE — RXMED WILLOW AMBULATORY MEDICATION CHARGE

## 2024-04-09 ENCOUNTER — PHARMACY VISIT (OUTPATIENT)
Dept: PHARMACY | Facility: CLINIC | Age: 69
End: 2024-04-09
Payer: COMMERCIAL

## 2024-04-13 PROCEDURE — RXMED WILLOW AMBULATORY MEDICATION CHARGE

## 2024-04-15 ENCOUNTER — PHARMACY VISIT (OUTPATIENT)
Dept: PHARMACY | Facility: CLINIC | Age: 69
End: 2024-04-15
Payer: COMMERCIAL

## 2024-04-24 PROCEDURE — RXMED WILLOW AMBULATORY MEDICATION CHARGE

## 2024-04-26 ENCOUNTER — PHARMACY VISIT (OUTPATIENT)
Dept: PHARMACY | Facility: CLINIC | Age: 69
End: 2024-04-26
Payer: COMMERCIAL

## 2024-04-29 ENCOUNTER — PHARMACY VISIT (OUTPATIENT)
Dept: PHARMACY | Facility: CLINIC | Age: 69
End: 2024-04-29
Payer: COMMERCIAL

## 2024-04-29 PROCEDURE — RXMED WILLOW AMBULATORY MEDICATION CHARGE

## 2024-05-06 PROCEDURE — RXMED WILLOW AMBULATORY MEDICATION CHARGE

## 2024-05-08 ENCOUNTER — PHARMACY VISIT (OUTPATIENT)
Dept: PHARMACY | Facility: CLINIC | Age: 69
End: 2024-05-08
Payer: COMMERCIAL

## 2024-05-23 ENCOUNTER — HOSPITAL ENCOUNTER (OUTPATIENT)
Dept: RADIOLOGY | Facility: CLINIC | Age: 69
Discharge: HOME | End: 2024-05-23
Payer: MEDICARE

## 2024-05-23 DIAGNOSIS — Z13.820 ENCOUNTER FOR SCREENING FOR OSTEOPOROSIS: ICD-10-CM

## 2024-05-23 DIAGNOSIS — Z78.0 ASYMPTOMATIC MENOPAUSAL STATE: ICD-10-CM

## 2024-05-23 PROCEDURE — 77080 DXA BONE DENSITY AXIAL: CPT

## 2024-05-25 PROCEDURE — RXMED WILLOW AMBULATORY MEDICATION CHARGE

## 2024-05-28 PROCEDURE — RXMED WILLOW AMBULATORY MEDICATION CHARGE

## 2024-05-29 ENCOUNTER — PHARMACY VISIT (OUTPATIENT)
Dept: PHARMACY | Facility: CLINIC | Age: 69
End: 2024-05-29
Payer: COMMERCIAL

## 2024-06-01 PROCEDURE — 99285 EMERGENCY DEPT VISIT HI MDM: CPT

## 2024-06-02 ENCOUNTER — CLINICAL SUPPORT (OUTPATIENT)
Dept: EMERGENCY MEDICINE | Facility: HOSPITAL | Age: 69
End: 2024-06-02
Payer: MEDICARE

## 2024-06-02 ENCOUNTER — APPOINTMENT (OUTPATIENT)
Dept: RADIOLOGY | Facility: HOSPITAL | Age: 69
End: 2024-06-02
Payer: MEDICARE

## 2024-06-02 ENCOUNTER — HOSPITAL ENCOUNTER (OUTPATIENT)
Facility: HOSPITAL | Age: 69
Setting detail: OBSERVATION
Discharge: HOME | End: 2024-06-03
Attending: EMERGENCY MEDICINE | Admitting: NURSE PRACTITIONER
Payer: MEDICARE

## 2024-06-02 DIAGNOSIS — R07.9 CHEST PAIN, UNSPECIFIED TYPE: Primary | ICD-10-CM

## 2024-06-02 DIAGNOSIS — I25.9 CHEST PAIN DUE TO MYOCARDIAL ISCHEMIA, UNSPECIFIED ISCHEMIC CHEST PAIN TYPE: ICD-10-CM

## 2024-06-02 DIAGNOSIS — I50.22 CHRONIC SYSTOLIC CONGESTIVE HEART FAILURE (MULTI): ICD-10-CM

## 2024-06-02 LAB
ALBUMIN SERPL BCP-MCNC: 3.5 G/DL (ref 3.4–5)
ALP SERPL-CCNC: 74 U/L (ref 33–136)
ALT SERPL W P-5'-P-CCNC: 7 U/L (ref 7–45)
ANION GAP SERPL CALC-SCNC: 12 MMOL/L (ref 10–20)
APTT PPP: 34 SECONDS (ref 27–38)
AST SERPL W P-5'-P-CCNC: 17 U/L (ref 9–39)
ATRIAL RATE: 69 BPM
BASOPHILS # BLD AUTO: 0.03 X10*3/UL (ref 0–0.1)
BASOPHILS NFR BLD AUTO: 0.5 %
BILIRUB SERPL-MCNC: 0.3 MG/DL (ref 0–1.2)
BNP SERPL-MCNC: 134 PG/ML (ref 0–99)
BNP SERPL-MCNC: 297 PG/ML (ref 0–99)
BUN SERPL-MCNC: 11 MG/DL (ref 6–23)
CALCIUM SERPL-MCNC: 7.7 MG/DL (ref 8.6–10.6)
CARDIAC TROPONIN I PNL SERPL HS: 13 NG/L (ref 0–34)
CARDIAC TROPONIN I PNL SERPL HS: 19 NG/L (ref 0–34)
CHLORIDE SERPL-SCNC: 109 MMOL/L (ref 98–107)
CO2 SERPL-SCNC: 25 MMOL/L (ref 21–32)
CREAT SERPL-MCNC: 1.54 MG/DL (ref 0.5–1.05)
EGFRCR SERPLBLD CKD-EPI 2021: 36 ML/MIN/1.73M*2
EOSINOPHIL # BLD AUTO: 0.17 X10*3/UL (ref 0–0.7)
EOSINOPHIL NFR BLD AUTO: 2.9 %
ERYTHROCYTE [DISTWIDTH] IN BLOOD BY AUTOMATED COUNT: 15 % (ref 11.5–14.5)
GLUCOSE BLD MANUAL STRIP-MCNC: 162 MG/DL (ref 74–99)
GLUCOSE BLD MANUAL STRIP-MCNC: 172 MG/DL (ref 74–99)
GLUCOSE BLD MANUAL STRIP-MCNC: 225 MG/DL (ref 74–99)
GLUCOSE BLD MANUAL STRIP-MCNC: 77 MG/DL (ref 74–99)
GLUCOSE SERPL-MCNC: 144 MG/DL (ref 74–99)
HCT VFR BLD AUTO: 36.2 % (ref 36–46)
HGB BLD-MCNC: 11.7 G/DL (ref 12–16)
IMM GRANULOCYTES # BLD AUTO: 0.01 X10*3/UL (ref 0–0.7)
IMM GRANULOCYTES NFR BLD AUTO: 0.2 % (ref 0–0.9)
INR PPP: 2.3 (ref 0.9–1.1)
LYMPHOCYTES # BLD AUTO: 2.42 X10*3/UL (ref 1.2–4.8)
LYMPHOCYTES NFR BLD AUTO: 41.9 %
MAGNESIUM SERPL-MCNC: 2.24 MG/DL (ref 1.6–2.4)
MCH RBC QN AUTO: 28.7 PG (ref 26–34)
MCHC RBC AUTO-ENTMCNC: 32.3 G/DL (ref 32–36)
MCV RBC AUTO: 89 FL (ref 80–100)
MONOCYTES # BLD AUTO: 0.44 X10*3/UL (ref 0.1–1)
MONOCYTES NFR BLD AUTO: 7.6 %
NEUTROPHILS # BLD AUTO: 2.7 X10*3/UL (ref 1.2–7.7)
NEUTROPHILS NFR BLD AUTO: 46.9 %
NRBC BLD-RTO: 0 /100 WBCS (ref 0–0)
P OFFSET: 167 MS
P ONSET: 120 MS
PLATELET # BLD AUTO: 180 X10*3/UL (ref 150–450)
POTASSIUM SERPL-SCNC: 4.7 MMOL/L (ref 3.5–5.3)
PR INTERVAL: 118 MS
PROT SERPL-MCNC: 6.6 G/DL (ref 6.4–8.2)
PROTHROMBIN TIME: 26.1 SECONDS (ref 9.8–12.8)
Q ONSET: 179 MS
QRS COUNT: 11 BEATS
QRS DURATION: 178 MS
QT INTERVAL: 538 MS
QTC CALCULATION(BAZETT): 576 MS
QTC FREDERICIA: 564 MS
R AXIS: 267 DEGREES
RBC # BLD AUTO: 4.08 X10*6/UL (ref 4–5.2)
SODIUM SERPL-SCNC: 141 MMOL/L (ref 136–145)
T AXIS: 66 DEGREES
T OFFSET: 448 MS
VENTRICULAR RATE: 69 BPM
WBC # BLD AUTO: 5.8 X10*3/UL (ref 4.4–11.3)

## 2024-06-02 PROCEDURE — 85025 COMPLETE CBC W/AUTO DIFF WBC: CPT

## 2024-06-02 PROCEDURE — 99222 1ST HOSP IP/OBS MODERATE 55: CPT | Performed by: NURSE PRACTITIONER

## 2024-06-02 PROCEDURE — 96374 THER/PROPH/DIAG INJ IV PUSH: CPT | Performed by: NURSE PRACTITIONER

## 2024-06-02 PROCEDURE — 96375 TX/PRO/DX INJ NEW DRUG ADDON: CPT | Performed by: NURSE PRACTITIONER

## 2024-06-02 PROCEDURE — G0378 HOSPITAL OBSERVATION PER HR: HCPCS

## 2024-06-02 PROCEDURE — 36415 COLL VENOUS BLD VENIPUNCTURE: CPT

## 2024-06-02 PROCEDURE — 2500000004 HC RX 250 GENERAL PHARMACY W/ HCPCS (ALT 636 FOR OP/ED): Mod: SE | Performed by: NURSE PRACTITIONER

## 2024-06-02 PROCEDURE — 84484 ASSAY OF TROPONIN QUANT: CPT

## 2024-06-02 PROCEDURE — 85610 PROTHROMBIN TIME: CPT

## 2024-06-02 PROCEDURE — 83880 ASSAY OF NATRIURETIC PEPTIDE: CPT | Mod: 91,MUE | Performed by: STUDENT IN AN ORGANIZED HEALTH CARE EDUCATION/TRAINING PROGRAM

## 2024-06-02 PROCEDURE — 83735 ASSAY OF MAGNESIUM: CPT

## 2024-06-02 PROCEDURE — 2500000002 HC RX 250 W HCPCS SELF ADMINISTERED DRUGS (ALT 637 FOR MEDICARE OP, ALT 636 FOR OP/ED): Mod: SE | Performed by: NURSE PRACTITIONER

## 2024-06-02 PROCEDURE — 83880 ASSAY OF NATRIURETIC PEPTIDE: CPT

## 2024-06-02 PROCEDURE — 93005 ELECTROCARDIOGRAM TRACING: CPT

## 2024-06-02 PROCEDURE — 2500000001 HC RX 250 WO HCPCS SELF ADMINISTERED DRUGS (ALT 637 FOR MEDICARE OP): Mod: SE | Performed by: NURSE PRACTITIONER

## 2024-06-02 PROCEDURE — 82947 ASSAY GLUCOSE BLOOD QUANT: CPT | Mod: 59

## 2024-06-02 PROCEDURE — 71046 X-RAY EXAM CHEST 2 VIEWS: CPT | Performed by: RADIOLOGY

## 2024-06-02 PROCEDURE — 80053 COMPREHEN METABOLIC PANEL: CPT

## 2024-06-02 PROCEDURE — 84484 ASSAY OF TROPONIN QUANT: CPT | Mod: 91

## 2024-06-02 PROCEDURE — 71046 X-RAY EXAM CHEST 2 VIEWS: CPT

## 2024-06-02 PROCEDURE — 2500000004 HC RX 250 GENERAL PHARMACY W/ HCPCS (ALT 636 FOR OP/ED): Mod: SE

## 2024-06-02 RX ORDER — ATORVASTATIN CALCIUM 40 MG/1
40 TABLET, FILM COATED ORAL DAILY
Status: DISCONTINUED | OUTPATIENT
Start: 2024-06-02 | End: 2024-06-03 | Stop reason: HOSPADM

## 2024-06-02 RX ORDER — ACETAMINOPHEN 160 MG/5ML
650 SOLUTION ORAL EVERY 4 HOURS PRN
Status: DISCONTINUED | OUTPATIENT
Start: 2024-06-02 | End: 2024-06-03 | Stop reason: HOSPADM

## 2024-06-02 RX ORDER — FLUTICASONE FUROATE AND VILANTEROL 200; 25 UG/1; UG/1
1 POWDER RESPIRATORY (INHALATION)
Status: DISCONTINUED | OUTPATIENT
Start: 2024-06-02 | End: 2024-06-03 | Stop reason: HOSPADM

## 2024-06-02 RX ORDER — ACETAMINOPHEN 325 MG/1
650 TABLET ORAL EVERY 4 HOURS PRN
Status: DISCONTINUED | OUTPATIENT
Start: 2024-06-02 | End: 2024-06-03 | Stop reason: HOSPADM

## 2024-06-02 RX ORDER — POLYETHYLENE GLYCOL 3350 17 G/17G
17 POWDER, FOR SOLUTION ORAL DAILY PRN
Status: DISCONTINUED | OUTPATIENT
Start: 2024-06-02 | End: 2024-06-03 | Stop reason: HOSPADM

## 2024-06-02 RX ORDER — NAPROXEN SODIUM 220 MG/1
81 TABLET, FILM COATED ORAL DAILY
Status: DISCONTINUED | OUTPATIENT
Start: 2024-06-02 | End: 2024-06-03 | Stop reason: HOSPADM

## 2024-06-02 RX ORDER — FUROSEMIDE 40 MG/1
40 TABLET ORAL DAILY
Status: DISCONTINUED | OUTPATIENT
Start: 2024-06-02 | End: 2024-06-03 | Stop reason: HOSPADM

## 2024-06-02 RX ORDER — GLIPIZIDE 5 MG/1
10 TABLET, FILM COATED, EXTENDED RELEASE ORAL
Status: DISCONTINUED | OUTPATIENT
Start: 2024-06-02 | End: 2024-06-03 | Stop reason: HOSPADM

## 2024-06-02 RX ORDER — AMIODARONE HYDROCHLORIDE 200 MG/1
100 TABLET ORAL DAILY
Status: DISCONTINUED | OUTPATIENT
Start: 2024-06-02 | End: 2024-06-03 | Stop reason: HOSPADM

## 2024-06-02 RX ORDER — HYDROMORPHONE HYDROCHLORIDE 1 MG/ML
0.5 INJECTION, SOLUTION INTRAMUSCULAR; INTRAVENOUS; SUBCUTANEOUS ONCE
Status: COMPLETED | OUTPATIENT
Start: 2024-06-02 | End: 2024-06-02

## 2024-06-02 RX ORDER — DAPAGLIFLOZIN 5 MG/1
5 TABLET, FILM COATED ORAL DAILY
Status: DISCONTINUED | OUTPATIENT
Start: 2024-06-02 | End: 2024-06-03 | Stop reason: HOSPADM

## 2024-06-02 RX ORDER — ACETAMINOPHEN 650 MG/1
650 SUPPOSITORY RECTAL EVERY 4 HOURS PRN
Status: DISCONTINUED | OUTPATIENT
Start: 2024-06-02 | End: 2024-06-03 | Stop reason: HOSPADM

## 2024-06-02 RX ORDER — DEXTROSE 50 % IN WATER (D50W) INTRAVENOUS SYRINGE
25
Status: DISCONTINUED | OUTPATIENT
Start: 2024-06-02 | End: 2024-06-03 | Stop reason: HOSPADM

## 2024-06-02 RX ORDER — ACETAMINOPHEN 500 MG
10 TABLET ORAL NIGHTLY PRN
Status: DISCONTINUED | OUTPATIENT
Start: 2024-06-02 | End: 2024-06-03 | Stop reason: HOSPADM

## 2024-06-02 RX ORDER — PANTOPRAZOLE SODIUM 40 MG/1
40 TABLET, DELAYED RELEASE ORAL
Status: DISCONTINUED | OUTPATIENT
Start: 2024-06-02 | End: 2024-06-03 | Stop reason: HOSPADM

## 2024-06-02 RX ORDER — INSULIN GLARGINE 100 [IU]/ML
15 INJECTION, SOLUTION SUBCUTANEOUS NIGHTLY
Status: DISCONTINUED | OUTPATIENT
Start: 2024-06-02 | End: 2024-06-03 | Stop reason: HOSPADM

## 2024-06-02 RX ORDER — WARFARIN 7.5 MG/1
3.75 TABLET ORAL DAILY
Status: DISCONTINUED | OUTPATIENT
Start: 2024-06-02 | End: 2024-06-03 | Stop reason: HOSPADM

## 2024-06-02 RX ORDER — INSULIN LISPRO 100 [IU]/ML
0-5 INJECTION, SOLUTION INTRAVENOUS; SUBCUTANEOUS
Status: DISCONTINUED | OUTPATIENT
Start: 2024-06-02 | End: 2024-06-03 | Stop reason: HOSPADM

## 2024-06-02 RX ORDER — FUROSEMIDE 10 MG/ML
40 INJECTION INTRAMUSCULAR; INTRAVENOUS ONCE
Status: COMPLETED | OUTPATIENT
Start: 2024-06-02 | End: 2024-06-02

## 2024-06-02 RX ORDER — CARVEDILOL 3.12 MG/1
3.12 TABLET ORAL
Status: DISCONTINUED | OUTPATIENT
Start: 2024-06-02 | End: 2024-06-03 | Stop reason: HOSPADM

## 2024-06-02 RX ORDER — DEXTROSE 50 % IN WATER (D50W) INTRAVENOUS SYRINGE
12.5
Status: DISCONTINUED | OUTPATIENT
Start: 2024-06-02 | End: 2024-06-03 | Stop reason: HOSPADM

## 2024-06-02 RX ADMIN — AMIODARONE HYDROCHLORIDE 100 MG: 200 TABLET ORAL at 21:37

## 2024-06-02 RX ADMIN — FLUTICASONE FUROATE AND VILANTEROL TRIFENATATE 1 PUFF: 200; 25 POWDER RESPIRATORY (INHALATION) at 09:24

## 2024-06-02 RX ADMIN — DAPAGLIFLOZIN 5 MG: 5 TABLET, FILM COATED ORAL at 09:25

## 2024-06-02 RX ADMIN — WARFARIN SODIUM 3.75 MG: 7.5 TABLET ORAL at 23:26

## 2024-06-02 RX ADMIN — INSULIN LISPRO 1 UNITS: 100 INJECTION, SOLUTION INTRAVENOUS; SUBCUTANEOUS at 17:44

## 2024-06-02 RX ADMIN — INSULIN GLARGINE 15 UNITS: 100 INJECTION, SOLUTION SUBCUTANEOUS at 21:38

## 2024-06-02 RX ADMIN — SACUBITRIL AND VALSARTAN 1 TABLET: 49; 51 TABLET, FILM COATED ORAL at 21:37

## 2024-06-02 RX ADMIN — CARVEDILOL 3.12 MG: 3.12 TABLET, FILM COATED ORAL at 21:41

## 2024-06-02 RX ADMIN — PANTOPRAZOLE SODIUM 40 MG: 40 TABLET, DELAYED RELEASE ORAL at 09:24

## 2024-06-02 RX ADMIN — CARVEDILOL 3.12 MG: 3.12 TABLET, FILM COATED ORAL at 09:24

## 2024-06-02 RX ADMIN — FUROSEMIDE 40 MG: 10 INJECTION, SOLUTION INTRAVENOUS at 05:34

## 2024-06-02 RX ADMIN — HYDROMORPHONE HYDROCHLORIDE 0.5 MG: 1 INJECTION, SOLUTION INTRAMUSCULAR; INTRAVENOUS; SUBCUTANEOUS at 01:43

## 2024-06-02 RX ADMIN — FUROSEMIDE 40 MG: 40 TABLET ORAL at 09:25

## 2024-06-02 RX ADMIN — ASPIRIN 81 MG 81 MG: 81 TABLET ORAL at 09:24

## 2024-06-02 RX ADMIN — ATORVASTATIN CALCIUM 40 MG: 40 TABLET, FILM COATED ORAL at 21:36

## 2024-06-02 RX ADMIN — SACUBITRIL AND VALSARTAN 1 TABLET: 49; 51 TABLET, FILM COATED ORAL at 09:25

## 2024-06-02 RX ADMIN — INSULIN LISPRO 2 UNITS: 100 INJECTION, SOLUTION INTRAVENOUS; SUBCUTANEOUS at 12:17

## 2024-06-02 SDOH — SOCIAL STABILITY: SOCIAL INSECURITY: DO YOU FEEL UNSAFE GOING BACK TO THE PLACE WHERE YOU ARE LIVING?: NO

## 2024-06-02 SDOH — SOCIAL STABILITY: SOCIAL INSECURITY: HAS ANYONE EVER THREATENED TO HURT YOUR FAMILY OR YOUR PETS?: NO

## 2024-06-02 SDOH — SOCIAL STABILITY: SOCIAL INSECURITY: DOES ANYONE TRY TO KEEP YOU FROM HAVING/CONTACTING OTHER FRIENDS OR DOING THINGS OUTSIDE YOUR HOME?: NO

## 2024-06-02 SDOH — SOCIAL STABILITY: SOCIAL INSECURITY: WERE YOU ABLE TO COMPLETE ALL THE BEHAVIORAL HEALTH SCREENINGS?: YES

## 2024-06-02 SDOH — SOCIAL STABILITY: SOCIAL INSECURITY: ARE THERE ANY APPARENT SIGNS OF INJURIES/BEHAVIORS THAT COULD BE RELATED TO ABUSE/NEGLECT?: NO

## 2024-06-02 SDOH — SOCIAL STABILITY: SOCIAL INSECURITY: DO YOU FEEL ANYONE HAS EXPLOITED OR TAKEN ADVANTAGE OF YOU FINANCIALLY OR OF YOUR PERSONAL PROPERTY?: NO

## 2024-06-02 SDOH — SOCIAL STABILITY: SOCIAL INSECURITY: HAVE YOU HAD ANY THOUGHTS OF HARMING ANYONE ELSE?: NO

## 2024-06-02 SDOH — SOCIAL STABILITY: SOCIAL INSECURITY: ABUSE: ADULT

## 2024-06-02 SDOH — SOCIAL STABILITY: SOCIAL INSECURITY: HAVE YOU HAD THOUGHTS OF HARMING ANYONE ELSE?: NO

## 2024-06-02 SDOH — SOCIAL STABILITY: SOCIAL INSECURITY: ARE YOU OR HAVE YOU BEEN THREATENED OR ABUSED PHYSICALLY, EMOTIONALLY, OR SEXUALLY BY ANYONE?: NO

## 2024-06-02 ASSESSMENT — COGNITIVE AND FUNCTIONAL STATUS - GENERAL
STANDING UP FROM CHAIR USING ARMS: A LITTLE
CLIMB 3 TO 5 STEPS WITH RAILING: A LITTLE
WALKING IN HOSPITAL ROOM: A LITTLE
MOVING TO AND FROM BED TO CHAIR: A LITTLE
MOBILITY SCORE: 20
TOILETING: A LITTLE
DAILY ACTIVITIY SCORE: 23
MOBILITY SCORE: 20
STANDING UP FROM CHAIR USING ARMS: A LITTLE
PATIENT BASELINE BEDBOUND: NO
CLIMB 3 TO 5 STEPS WITH RAILING: A LITTLE
WALKING IN HOSPITAL ROOM: A LITTLE
WALKING IN HOSPITAL ROOM: A LITTLE
STANDING UP FROM CHAIR USING ARMS: A LITTLE
MOVING TO AND FROM BED TO CHAIR: A LITTLE
MOVING TO AND FROM BED TO CHAIR: A LITTLE
DAILY ACTIVITIY SCORE: 24
CLIMB 3 TO 5 STEPS WITH RAILING: A LITTLE
DAILY ACTIVITIY SCORE: 24
MOBILITY SCORE: 20

## 2024-06-02 ASSESSMENT — ENCOUNTER SYMPTOMS
FLANK PAIN: 0
PALPITATIONS: 0
DYSURIA: 0
FEVER: 0
ABDOMINAL PAIN: 0
CONSTIPATION: 0
FREQUENCY: 0
NAUSEA: 0
SHORTNESS OF BREATH: 0
HEMATURIA: 0
CHILLS: 0
DIARRHEA: 0
VOMITING: 0

## 2024-06-02 ASSESSMENT — ACTIVITIES OF DAILY LIVING (ADL)
ADEQUATE_TO_COMPLETE_ADL: YES
JUDGMENT_ADEQUATE_SAFELY_COMPLETE_DAILY_ACTIVITIES: YES
FEEDING YOURSELF: INDEPENDENT
DRESSING YOURSELF: INDEPENDENT
TOILETING: NEEDS ASSISTANCE
LACK_OF_TRANSPORTATION: NO
HEARING - LEFT EAR: FUNCTIONAL
HEARING - RIGHT EAR: FUNCTIONAL
WALKS IN HOME: NEEDS ASSISTANCE
PATIENT'S MEMORY ADEQUATE TO SAFELY COMPLETE DAILY ACTIVITIES?: YES
GROOMING: INDEPENDENT
BATHING: INDEPENDENT

## 2024-06-02 ASSESSMENT — COLUMBIA-SUICIDE SEVERITY RATING SCALE - C-SSRS
2. HAVE YOU ACTUALLY HAD ANY THOUGHTS OF KILLING YOURSELF?: NO
1. IN THE PAST MONTH, HAVE YOU WISHED YOU WERE DEAD OR WISHED YOU COULD GO TO SLEEP AND NOT WAKE UP?: NO
6. HAVE YOU EVER DONE ANYTHING, STARTED TO DO ANYTHING, OR PREPARED TO DO ANYTHING TO END YOUR LIFE?: NO

## 2024-06-02 ASSESSMENT — LIFESTYLE VARIABLES
HOW OFTEN DO YOU HAVE 6 OR MORE DRINKS ON ONE OCCASION: NEVER
SKIP TO QUESTIONS 9-10: 1
HAVE PEOPLE ANNOYED YOU BY CRITICIZING YOUR DRINKING: NO
AUDIT-C TOTAL SCORE: 0
TOTAL SCORE: 0
EVER FELT BAD OR GUILTY ABOUT YOUR DRINKING: NO
AUDIT-C TOTAL SCORE: 0
HOW OFTEN DO YOU HAVE A DRINK CONTAINING ALCOHOL: NEVER
SUBSTANCE_ABUSE_PAST_12_MONTHS: NO
HAVE YOU EVER FELT YOU SHOULD CUT DOWN ON YOUR DRINKING: NO
PRESCIPTION_ABUSE_PAST_12_MONTHS: NO
EVER HAD A DRINK FIRST THING IN THE MORNING TO STEADY YOUR NERVES TO GET RID OF A HANGOVER: NO
HOW MANY STANDARD DRINKS CONTAINING ALCOHOL DO YOU HAVE ON A TYPICAL DAY: PATIENT DOES NOT DRINK

## 2024-06-02 ASSESSMENT — PAIN - FUNCTIONAL ASSESSMENT: PAIN_FUNCTIONAL_ASSESSMENT: 0-10

## 2024-06-02 ASSESSMENT — PATIENT HEALTH QUESTIONNAIRE - PHQ9
2. FEELING DOWN, DEPRESSED OR HOPELESS: NOT AT ALL
SUM OF ALL RESPONSES TO PHQ9 QUESTIONS 1 & 2: 0
1. LITTLE INTEREST OR PLEASURE IN DOING THINGS: NOT AT ALL

## 2024-06-02 ASSESSMENT — PAIN SCALES - GENERAL
PAINLEVEL_OUTOF10: 3
PAINLEVEL_OUTOF10: 0 - NO PAIN

## 2024-06-02 ASSESSMENT — PAIN DESCRIPTION - DESCRIPTORS
DESCRIPTORS: DISCOMFORT;SHOOTING
DESCRIPTORS: SHOOTING;SHARP

## 2024-06-02 NOTE — ED PROVIDER NOTES
CC: Chest Pain     HPI: Kenna Ravi is an 68 y.o. female with history including coronary artery disease with MI, V. tach, presenting to the emergency department for chest pain.  She has been having left-sided chest pain.  Patient reported to triage that she felt like her defibrillator fired 4 times.  She says that this pain has been shooting pain that is different than her defibrillator firing.  She describes it as left-sided.  Nonradiating.  Associated with shortness of breath.  No nausea no vomiting no diaphoresis.    Triage note was reviewed and patient stated that it felt different than her defibrillator firing to me.  Limitations to History:  none  Additional History Obtained from: Cardiology note from 6/2/2024    PMHx/PSHx:  Per HPI.   - has a past medical history of Other conditions influencing health status.  - has a past surgical history that includes Tonsillectomy (09/15/2013) and Other surgical history (09/26/2018).    Social History:  - Tobacco:  reports that she has quit smoking. Her smoking use included cigarettes. She has a 5 pack-year smoking history. She has never used smokeless tobacco.   - Alcohol:  reports no history of alcohol use.   - Drugs:  reports no history of drug use.     Medications: Reviewed in EMR.     Allergies:  Iodinated contrast media, Iodine, Penicillins, Enoxaparin, and Lisinopril    ???????????????????????????????????????????????????????????????  Triage Vitals:  T 37 °C (98.6 °F)  HR 80  /89  RR 20  O2 94 % None (Room air)    Physical Exam  Vitals and nursing note reviewed.   Constitutional:       General: She is not in acute distress.  HENT:      Head: Normocephalic and atraumatic.   Eyes:      Conjunctiva/sclera: Conjunctivae normal.   Cardiovascular:      Rate and Rhythm: Normal rate and regular rhythm.      Heart sounds: Normal heart sounds. No murmur heard.  Pulmonary:      Effort: Pulmonary effort is normal. No respiratory distress.      Breath sounds: Normal  breath sounds. No decreased breath sounds.   Abdominal:      General: There is no distension.      Palpations: Abdomen is soft.   Musculoskeletal:         General: No deformity.      Cervical back: Normal range of motion.   Skin:     General: Skin is warm and dry.   Neurological:      Mental Status: She is alert and oriented to person, place, and time.   Psychiatric:         Mood and Affect: Mood normal.         Behavior: Behavior normal.       ???????????????????????????????????????????????????????????????  EKG (per my independent interpretation): Please see ED course    ED Course/Medical Decision Making:    ED Course as of 06/02/24 2022   Sun Jun 02, 2024   0031 EKG was independently interpreted which showed a atrial sensed ventricularly paced rhythm at a rate of 75.  Does not meet Sgarbossa criteria. [KAELA]   0420 Patient's lab work was reviewed which showed a CBC that had an anemia.  CMP shows a elevated creatinine in the setting of CKD.  Troponins were 13-19.  Discussed admission with the inpatient team.  Patient will be admitted for further management.  Cardiology was called to interrogate the defibrillator since it went off 4 times today [KAELA]      ED Course User Index  [KAELA] Izabel Callahan MD         Diagnoses as of 06/02/24 2022   Chest pain, unspecified type        Patient is a 68-year-old female with past medical history of V. Tach, CAD, who is presenting with chest pain.  Differential includes coronary artery disease versus pneumonia versus PE.  Patient is on blood thinners currently for PE.  Will obtain coags in addition to troponins CBC CMP.  Patient has a [employer] defibrillator.  She will require interrogation..  Coags showed that her warfarin is therapeutic.  Do not think that CT PE is indicated at this time.  Given troponins were negative and no signs of cardiac stress.  Patient was discussed with cardiology who will come and interrogate it but said that they feel comfortable admitting to the floor. As  a result of their workup, the patient will require admission to the hospital.  The patient was informed of their diagnosis.  Patient was given the opportunity to ask questions and answered them.  Patient agreed to be admitted to the hospital.      External records reviewed: recent inpatient, clinic, and prior ED notes  Diagnostic imaging independently reviewed/interpreted by me (as reflected in MDM) includes: labs, chest xray, ekg  Social Determinants Affecting Care:   Discussion of management with other providers: ED attending,  cardiology  Prescription Drug Consideration: dilaudid, patient was allergic to aspirin  Escalation of Care: considered admission    Pt was seen and discussed with ED attending     Impression:   Chest pain  Hx of PE  Hx of V tach      Disposition: admit        Procedures ? Smart[employer] last updated 6/2/2024 12:40 AM        Izabel Callahan MD  Resident  06/02/24 2031

## 2024-06-02 NOTE — PROGRESS NOTES
Kenna Ravi is a 68 y.o. female on day 0 of admission presenting with Chest pain.     The patient was seen and examined at bedside. No current chest pain. Awaiting device interrogation, however less likely that device fired as troponins are negative.    Jay Jay Barnett MD     The patient encounter includes all but not limited to; Evaluation of laboratory results, pertinent imaging, and vital signs. Daily updates are discussed with any consulting services and family/medical power of  as needed. The patient's discharge  process begins at admission and daily contact with the patient's TCC and SW is pertinent in their efficient and safe discharge.

## 2024-06-02 NOTE — ED TRIAGE NOTES
Pt BIB [facility] for chest pain. Pt states that she has been experiencing mid-sternal chest pain throughout the day that comes and goes. She also states that her defibrillator went off four times today. Pt states she might be due for a new battery.

## 2024-06-02 NOTE — CARE PLAN
The patient's goals for the shift include  Improved CP    The clinical goals for the shift include  Will have controlled pain during shift

## 2024-06-02 NOTE — H&P
"History Of Present Illness  Kenna Ravi is a 68 y.o. female with a past medical history of CAD, CKD (baseline sCr 1.3-1.5), COPD, T2DM, remote MI, vtach s/p AICD, HFrEF (EF 35-40% 7/2023), and remote DVT (on Coumadin) who presented to the ED with left-sided chest pain. Of import, documentation shows the patient initially reported feeling her ICD fire 4 times, however upon clarification, she denies feeling any defibrillator firing. She notes the pain is \"worse than childbirth\". She additionally endorses some orthopnea. She denies any fever, chills, cough, shortness of breath, nausea, vomiting, or increased edema. She reports being compliant with her home medications.      Past Medical History  Past Medical History:   Diagnosis Date    Other conditions influencing health status     Acute Myocardial Infarction       Surgical History  Past Surgical History:   Procedure Laterality Date    OTHER SURGICAL HISTORY  09/26/2018    Implantable Cardioverter-Defibrillator    TONSILLECTOMY  09/15/2013    Tonsillectomy        Social History  She reports that she has quit smoking. Her smoking use included cigarettes. She has a 5 pack-year smoking history. She has never used smokeless tobacco. She reports that she does not drink alcohol and does not use drugs.    Family History  No family history on file.     Allergies  Iodinated contrast media, Iodine, Penicillins, Enoxaparin, and Lisinopril    Review of Systems   Constitutional:  Negative for chills and fever.   Respiratory:  Negative for shortness of breath.    Cardiovascular:  Positive for chest pain. Negative for palpitations.   Gastrointestinal:  Negative for abdominal pain, constipation, diarrhea, nausea and vomiting.   Genitourinary:  Negative for dysuria, flank pain, frequency, hematuria and urgency.   All other systems reviewed and are negative.       Physical Exam     Last Recorded Vitals  Blood pressure 153/90, pulse 73, temperature 37 °C (98.6 °F), temperature source " "Temporal, resp. rate 13, height 1.626 m (5' 4\"), weight 86.2 kg (190 lb), SpO2 96%.    Relevant Results      Lab Results   Component Value Date    WBC 5.8 06/02/2024    HGB 11.7 (L) 06/02/2024    HCT 36.2 06/02/2024    MCV 89 06/02/2024     06/02/2024     Lab Results   Component Value Date    GLUCOSE 144 (H) 06/02/2024    CALCIUM 7.7 (L) 06/02/2024     06/02/2024    K 4.7 06/02/2024    CO2 25 06/02/2024     (H) 06/02/2024    BUN 11 06/02/2024    CREATININE 1.54 (H) 06/02/2024     Lab Results   Component Value Date    HGBA1C 8.0 (A) 08/01/2023         XR chest 2 views  Narrative: Interpreted By:  Blanca Soriano and Stephens Katherine   STUDY:  XR CHEST 2 VIEWS;  6/2/2024 12:53 am      INDICATION:  Signs/Symptoms:Chest Pain.      COMPARISON:  Chest radiograph 07/20/2023      ACCESSION NUMBER(S):  [id number]      ORDERING CLINICIAN:  STACI KINNEY      FINDINGS:  PA and lateral radiographs of the chest were provided.      Left chest wall cardiac pacemaker/AICD with leads overlying the  cardiomediastinal silhouette.      CARDIOMEDIASTINAL SILHOUETTE:  The cardiomediastinal silhouette is enlarged, stable in size and  configuration.      LUNGS:  Trace blunting of the costophrenic angles. Mildly prominent perihilar  interstitial markings. Right lower lobe infiltrate or consolidation.  No pneumothorax.      ABDOMEN:  No remarkable upper abdominal findings.      BONES:  No acute osseous changes.      Impression: 1.  Right lower lobe airspace disease. Please correlate clinically  for pneumonia.  2. Mildly prominent perihilar interstitial markings which can be seen  in setting of pulmonary edema, recommend correlation with patient's  fluid status.  3. Cardiomegaly with pacemaker/AICD in similar position.      I personally reviewed the images/study and I agree with Leatha Jordan DO's (radiology resident) findings as stated. This study  was interpreted at [facility]" [place], [place].      MACRO:  None      Signed by: Blanca oSriano 6/2/2024 2:01 AM  Dictation workstation:   [id number]    ED Medication Administration from 06/01/2024 8443 to 06/02/2024 2993         Date/Time Order Dose Route Action Action by     06/02/2024 0143 EDT HYDROmorphone (Dilaudid) injection 0.5 mg 0.5 mg intravenous Given DAVIE Myles               Assessment/Plan   Principal Problem:    Chest pain      #Chest pain  #HFrEF  #Orthopnea  -Troponin -ve x2  -EKG without acute ischemic changes  -BNP pending  -Suspect a component of hypervolemia  -Lasix IV 40mg once  -Continue Lasix 40mg daily  -Continue home medications  -I&Os  -Consult cardiology/EP for device check in AM    #History of DVT  -Continue Coumadin  -Follow INR    #T2DM  -Well-controlled  -Continue Lantus at reduced dose  -Low SSI  -Hypoglycemic protocol    #COPD  -No acute issues  -Continue ICS/LABA    #CKD  -Creatinine at baseline  -Avoid nephrotoxic agents where possible  -Renal dosing where indicated           Zev Miller APRN-CNP

## 2024-06-02 NOTE — PROGRESS NOTES
Pharmacy Medication History Review    Kenna Ravi is a 68 y.o. female admitted for Chest pain. Pharmacy reviewed the patient's ulpyo-do-kbaxwhhoh medications and allergies for accuracy.    The list below reflects the updated PTA list. Comments regarding how patient may be taking medications differently can be found in the Admit Orders Activity  Prior to Admission Medications   Prescriptions Informant Patient Reported? Taking?   albuterol 2.5 mg /3 mL (0.083 %) nebulizer solution Self Yes PRN   Sig: Inhale every 8 hours if needed for wheezing.   amiodarone (Pacerone) 200 mg tablet Self Yes Yes   Sig: Take 0.5 tablets (100 mg) by mouth once daily.   aspirin 81 mg chewable tablet Self Yes Yes   Sig: Chew 1 tablet (81 mg) once daily.   atorvastatin (Lipitor) 40 mg tablet Self Yes Yes   Sig: Take 1 tablet (40 mg) by mouth once daily.   carvedilol (Coreg) 3.125 mg tablet Self Yes Yes   Sig: Take 1 tablet (3.125 mg) by mouth 2 times a day with meals.   dapagliflozin propanediol (Farxiga) 5 mg Self Yes Yes   Sig: Take 1 tablet (5 mg) by mouth once daily.   fluticasone (Flovent HFA) 220 mcg/actuation inhaler Self Yes Yes   Sig: Inhale 2 puffs 2 times a day.   furosemide (Lasix) 40 mg tablet Self Yes Yes   Sig: Take 1 tablet (40 mg) by mouth once daily.   glipiZIDE XL (Glucotrol XL) 10 mg 24 hr tablet Self Yes Yes   Sig: Take 1 tablet by mouth every day   insulin glargine (Lantus Solostar U-100 Insulin) 100 unit/mL (3 mL) pen Self Yes Yes   Sig: inject 22 units daily at bedtime   omeprazole (PriLOSEC) 40 mg DR capsule Self Yes Yes   Sig: TAKE ONE CAPSULE BY MOUTH EVERY DAY IN THE  MORNING   sacubitriL-valsartan (Entresto) 49-51 mg tablet Self Yes Yes   Sig: Take 1 tablet by mouth 2 times a day.   warfarin (Coumadin) 7.5 mg tablet Self Yes Yes   Sig: TAKE 1/2 TABLET BY MOUTH ONCE DAILY EXCEPT   FOR WEDNESDAYS AND SATURDAY TAKE A FULL TABLET      Facility-Administered Medications: None        The list below reflects the updated  allergy list. Please review each documented allergy for additional clarification and justification.  Allergies  Reviewed by Salvatore Alanis on 6/2/2024        Severity Reactions Comments    Iodinated Contrast Media High Anaphylaxis, Shortness of breath     Iodine High Anaphylaxis     Penicillins High Anaphylaxis     Enoxaparin Medium Itching, Rash, Swelling Skin breakdown on abdomen at injection sites    Lisinopril Medium Cough             Patient accepts M2B at discharge. Pharmacy has been updated to [facility].    Sources used to complete the med history include:  Patient interviewed about medications, alert, cooperative, conversive, pleasant and knew her medications, what they were for, the times she takes them and the doses  Epic Dispense Report reviewed  [facility] Clinical Summary reviewed  Care Everywhere, OHIP Summarization of Episode Note reviewed  5/29/24 [facility] Warfarin Clinic Visit reviewed    Below are additional concerns with the patient's PTA list.  **Patient want  to know that she really appreciates M2B, she thinks that it is a wonderful program**    ---------------------------------  Salvatore Alanis [facility]  Transitions of Care Technician  Medication reconciliation complete  Please reach out via [employer] Secure Chat for questions,   or if no response call Minka or [employer].   Meds Ambulatory [facility] Services

## 2024-06-03 ENCOUNTER — APPOINTMENT (OUTPATIENT)
Dept: CARDIOLOGY | Facility: HOSPITAL | Age: 69
End: 2024-06-03
Payer: MEDICARE

## 2024-06-03 VITALS
RESPIRATION RATE: 17 BRPM | HEIGHT: 64 IN | OXYGEN SATURATION: 98 % | TEMPERATURE: 97.5 F | BODY MASS INDEX: 32.44 KG/M2 | HEART RATE: 83 BPM | SYSTOLIC BLOOD PRESSURE: 100 MMHG | WEIGHT: 190.04 LBS | DIASTOLIC BLOOD PRESSURE: 64 MMHG

## 2024-06-03 LAB
ALBUMIN SERPL BCP-MCNC: 3 G/DL (ref 3.4–5)
ANION GAP SERPL CALC-SCNC: 10 MMOL/L (ref 10–20)
BUN SERPL-MCNC: 18 MG/DL (ref 6–23)
CALCIUM SERPL-MCNC: 7.5 MG/DL (ref 8.6–10.6)
CHLORIDE SERPL-SCNC: 107 MMOL/L (ref 98–107)
CO2 SERPL-SCNC: 28 MMOL/L (ref 21–32)
CREAT SERPL-MCNC: 1.85 MG/DL (ref 0.5–1.05)
CRP SERPL-MCNC: 0.13 MG/DL
EGFRCR SERPLBLD CKD-EPI 2021: 29 ML/MIN/1.73M*2
ERYTHROCYTE [DISTWIDTH] IN BLOOD BY AUTOMATED COUNT: 15.2 % (ref 11.5–14.5)
GLUCOSE BLD MANUAL STRIP-MCNC: 114 MG/DL (ref 74–99)
GLUCOSE BLD MANUAL STRIP-MCNC: 271 MG/DL (ref 74–99)
GLUCOSE SERPL-MCNC: 80 MG/DL (ref 74–99)
HCT VFR BLD AUTO: 35.5 % (ref 36–46)
HGB BLD-MCNC: 10.7 G/DL (ref 12–16)
MCH RBC QN AUTO: 28.1 PG (ref 26–34)
MCHC RBC AUTO-ENTMCNC: 30.1 G/DL (ref 32–36)
MCV RBC AUTO: 93 FL (ref 80–100)
NRBC BLD-RTO: 0 /100 WBCS (ref 0–0)
PHOSPHATE SERPL-MCNC: 3.5 MG/DL (ref 2.5–4.9)
PLATELET # BLD AUTO: 234 X10*3/UL (ref 150–450)
POTASSIUM SERPL-SCNC: 4.2 MMOL/L (ref 3.5–5.3)
RBC # BLD AUTO: 3.81 X10*6/UL (ref 4–5.2)
SODIUM SERPL-SCNC: 141 MMOL/L (ref 136–145)
WBC # BLD AUTO: 6.3 X10*3/UL (ref 4.4–11.3)

## 2024-06-03 PROCEDURE — 94640 AIRWAY INHALATION TREATMENT: CPT

## 2024-06-03 PROCEDURE — 82947 ASSAY GLUCOSE BLOOD QUANT: CPT | Mod: 91

## 2024-06-03 PROCEDURE — 86140 C-REACTIVE PROTEIN: CPT | Performed by: INTERNAL MEDICINE

## 2024-06-03 PROCEDURE — 2500000001 HC RX 250 WO HCPCS SELF ADMINISTERED DRUGS (ALT 637 FOR MEDICARE OP): Performed by: NURSE PRACTITIONER

## 2024-06-03 PROCEDURE — 99239 HOSP IP/OBS DSCHRG MGMT >30: CPT | Performed by: INTERNAL MEDICINE

## 2024-06-03 PROCEDURE — 85027 COMPLETE CBC AUTOMATED: CPT | Performed by: STUDENT IN AN ORGANIZED HEALTH CARE EDUCATION/TRAINING PROGRAM

## 2024-06-03 PROCEDURE — 93284 PRGRMG EVAL IMPLANTABLE DFB: CPT

## 2024-06-03 PROCEDURE — 36415 COLL VENOUS BLD VENIPUNCTURE: CPT | Performed by: STUDENT IN AN ORGANIZED HEALTH CARE EDUCATION/TRAINING PROGRAM

## 2024-06-03 PROCEDURE — 80069 RENAL FUNCTION PANEL: CPT | Performed by: STUDENT IN AN ORGANIZED HEALTH CARE EDUCATION/TRAINING PROGRAM

## 2024-06-03 PROCEDURE — 93284 PRGRMG EVAL IMPLANTABLE DFB: CPT | Performed by: INTERNAL MEDICINE

## 2024-06-03 PROCEDURE — 2500000002 HC RX 250 W HCPCS SELF ADMINISTERED DRUGS (ALT 637 FOR MEDICARE OP, ALT 636 FOR OP/ED): Performed by: NURSE PRACTITIONER

## 2024-06-03 PROCEDURE — G0378 HOSPITAL OBSERVATION PER HR: HCPCS

## 2024-06-03 PROCEDURE — 82947 ASSAY GLUCOSE BLOOD QUANT: CPT | Mod: 59

## 2024-06-03 PROCEDURE — 93290 INTERROG DEV EVAL ICPMS IP: CPT | Performed by: INTERNAL MEDICINE

## 2024-06-03 RX ADMIN — INSULIN LISPRO 3 UNITS: 100 INJECTION, SOLUTION INTRAVENOUS; SUBCUTANEOUS at 13:41

## 2024-06-03 RX ADMIN — FUROSEMIDE 40 MG: 40 TABLET ORAL at 09:12

## 2024-06-03 RX ADMIN — SACUBITRIL AND VALSARTAN 1 TABLET: 49; 51 TABLET, FILM COATED ORAL at 09:12

## 2024-06-03 RX ADMIN — ASPIRIN 81 MG 81 MG: 81 TABLET ORAL at 09:12

## 2024-06-03 RX ADMIN — GLIPIZIDE 10 MG: 5 TABLET, FILM COATED, EXTENDED RELEASE ORAL at 11:53

## 2024-06-03 RX ADMIN — CARVEDILOL 3.12 MG: 3.12 TABLET, FILM COATED ORAL at 09:12

## 2024-06-03 RX ADMIN — FLUTICASONE FUROATE AND VILANTEROL TRIFENATATE 1 PUFF: 200; 25 POWDER RESPIRATORY (INHALATION) at 08:03

## 2024-06-03 RX ADMIN — PANTOPRAZOLE SODIUM 40 MG: 40 TABLET, DELAYED RELEASE ORAL at 09:12

## 2024-06-03 RX ADMIN — DAPAGLIFLOZIN 5 MG: 5 TABLET, FILM COATED ORAL at 09:12

## 2024-06-03 ASSESSMENT — COGNITIVE AND FUNCTIONAL STATUS - GENERAL
WALKING IN HOSPITAL ROOM: A LITTLE
DAILY ACTIVITIY SCORE: 24
MOVING TO AND FROM BED TO CHAIR: A LITTLE
CLIMB 3 TO 5 STEPS WITH RAILING: A LITTLE
STANDING UP FROM CHAIR USING ARMS: A LITTLE
MOBILITY SCORE: 20

## 2024-06-03 ASSESSMENT — PAIN - FUNCTIONAL ASSESSMENT
PAIN_FUNCTIONAL_ASSESSMENT: 0-10
PAIN_FUNCTIONAL_ASSESSMENT: 0-10

## 2024-06-03 ASSESSMENT — PAIN SCALES - GENERAL
PAINLEVEL_OUTOF10: 0 - NO PAIN
PAINLEVEL_OUTOF10: 0 - NO PAIN

## 2024-06-03 ASSESSMENT — ACTIVITIES OF DAILY LIVING (ADL): LACK_OF_TRANSPORTATION: NO

## 2024-06-03 NOTE — DISCHARGE INSTRUCTIONS
Please follow up with your PCP to monitor Kidney function.   Please follow up with Coumadin clinic to monitor INR.

## 2024-06-03 NOTE — DISCHARGE SUMMARY
Discharge Diagnosis  Chest pain    Issues Requiring Follow-Up  PCP follow up for monitoring renal function.  Coumadin clinic follow up for monitoring INR.     Test Results Pending At Discharge  Pending Labs       No current pending labs.            Hospital Course     Kenna Ravi is a 68 year old female with a PMH of non obstructive CAD ( cath in Aug 2023), CKD (baseline sCr 1.3-1.5), COPD, T2DM, remote MI, vtach s/p AICD, HFrEF (EF 35-40% 7/2023), and remote DVT (on Coumadin) who was presented to the ED with left-sided chest pain. Of import, documentation shows the patient initially reported feeling her ICD fire 4 times, however upon clarification, she denies feeling any defibrillator firing. She additionally endorsed some orthopnea. She denied any fever, chills, cough.  She reported being compliant with her home medications.     Serial troponins were negative. AICD was interrogated by [facility] nurse and no acute events were noted.     Advised to continue her home medicines. Her CXR showed pulmonary congestion, there was no suspicion for pneumonia. She was treated with IV lasix 40 mg in ED and continued on home lasix 40 mg daily.     Discharged home in a stable condition. Advised PCP follow up for monitoring electrolytes.   Discharge day management time > 30 minutes.          Pertinent Physical Exam At Time of Discharge  Vitals:    06/03/24 1218   BP: 100/64   Pulse: 83   Resp: 17   Temp: 36.4 °C (97.5 °F)   SpO2: 98%       Physical Exam  Constitutional:       Appearance: Normal appearance.      Comments: Comfortable on RA    HENT:      Head: Normocephalic.      Nose: Nose normal.   Eyes:      Extraocular Movements: Extraocular movements intact.      Pupils: Pupils are equal, round, and reactive to light.   Cardiovascular:      Rate and Rhythm: Normal rate and regular rhythm.      Heart sounds: Normal heart sounds. No murmur heard.  Pulmonary:      Effort: No respiratory distress.      Breath sounds: Normal breath  sounds. No wheezing.   Abdominal:      General: There is no distension.      Palpations: Abdomen is soft.      Tenderness: There is no abdominal tenderness.   Musculoskeletal:         General: Normal range of motion.      Cervical back: Normal range of motion.   Skin:     General: Skin is warm.   Neurological:      General: No focal deficit present.      Mental Status: She is alert and oriented to person, place, and time.   Psychiatric:         Mood and Affect: Mood normal.         Home Medications     Medication List      CHANGE how you take these medications     warfarin 7.5 mg tablet; Commonly known as: Coumadin; TAKE 1/2 TABLET BY   MOUTH ONCE DAILY EXCEPT FOR WEDNESDAYS AND SATURDAY TAKE A FULL TABLET;   What changed: Another medication with the same name was removed. Continue   taking this medication, and follow the directions you see here.     CONTINUE taking these medications     albuterol 2.5 mg /3 mL (0.083 %) nebulizer solution   amiodarone 200 mg tablet; Commonly known as: Pacerone; Take 0.5 tablets   (100 mg) by mouth once daily.   aspirin 81 mg chewable tablet   atorvastatin 40 mg tablet; Commonly known as: Lipitor; Take 1 tablet (40   mg) by mouth once daily.   carvedilol 3.125 mg tablet; Commonly known as: Coreg; Take 1 tablet   (3.125 mg) by mouth 2 times a day with meals.   Entresto 49-51 mg tablet; Generic drug: sacubitriL-valsartan; Take 1   tablet by mouth 2 times a day.   Farxiga 5 mg; Generic drug: dapagliflozin propanediol; Take 1 tablet (5   mg) by mouth once daily.   Flovent  mcg/actuation inhaler; Generic drug: fluticasone   furosemide 40 mg tablet; Commonly known as: Lasix; Take 1 tablet (40 mg)   by mouth once daily.   glipiZIDE XL 10 mg 24 hr tablet; Commonly known as: Glucotrol XL; Take 1   tablet by mouth every day   Lantus Solostar U-100 Insulin 100 unit/mL (3 mL) pen; Generic drug:   insulin glargine; inject 22 units daily at bedtime   omeprazole 40 mg DR capsule; Commonly  known as: PriLOSEC; TAKE ONE   CAPSULE BY MOUTH EVERY DAY IN THE MORNING       Outpatient Follow-Up  No future appointments.    Murphy Marley MD

## 2024-06-03 NOTE — PROGRESS NOTES
06/03/24 1600   Discharge Planning   Living Arrangements Alone   Support Systems Family members   Assistance Needed None   Type of Residence Private residence   Number of Stairs to Enter Residence 6   Number of Stairs Within Residence 16   Do you have animals or pets at home? No   Who is requesting discharge planning? Provider   Home or Post Acute Services None   Patient expects to be discharged to: Home   Does the patient need discharge transport arranged? No   Financial Resource Strain   How hard is it for you to pay for the very basics like food, housing, medical care, and heating? Not very   Housing Stability   In the last 12 months, was there a time when you were not able to pay the mortgage or rent on time? Y   In the last 12 months, how many places have you lived? 1   In the last 12 months, was there a time when you did not have a steady place to sleep or slept in a shelter (including now)? N   Transportation Needs   In the past 12 months, has lack of transportation kept you from medical appointments or from getting medications? no   In the past 12 months, has lack of transportation kept you from meetings, work, or from getting things needed for daily living? No     Assessment Note:  Met with patient and Introduced myself as care coordinator and member of the Care Transitions team for discharge planning. Pt lives alone.  Transportation: Patient said she has transport when ready for discharge.  Pharmacy: [facility]  DME: No  Previous home care: No  Falls: No  PCP: Katie Bautista  Dialysis: No  Address, phone number and emergency contact verified. All questions and concerns answered. Patient going home no needs.    Transitional Care Coordination Progress Note:  Patient discussed during interdisciplinary rounds.   Team members present: MD and TCC  Plan per Medical/Surgical team: Patient medically ready.  Payor: [employer]  Discharge disposition: Home no needs.  Potential Barriers:  No  ADOD: 06/03/24

## 2024-06-03 NOTE — CARE PLAN
The patient's goals for the shift include        Problem: Pain  Goal: My pain/discomfort is manageable  Outcome: Progressing     Problem: Safety  Goal: Patient will be injury free during hospitalization  Outcome: Progressing  Goal: I will remain free of falls  Outcome: Progressing     Problem: Daily Care  Goal: Daily care needs are met  Outcome: Progressing     Problem: Psychosocial Needs  Goal: Demonstrates ability to cope with hospitalization/illness  Outcome: Progressing     Problem: Discharge Barriers  Goal: My discharge needs are met  Outcome: Progressing

## 2024-06-03 NOTE — NURSING NOTE
Pt is medically ready for discharge, Pt did not c/o Chest pain for the past 24 hrs. Pt VS is stable, PIV access is removed. Pt is educated and given discharge instructions. Pt has all her personal belonging with her. Pt is discharged home with michelle. Pt is waiting for transport in her room with no further need.

## 2024-06-04 ENCOUNTER — HOSPITAL ENCOUNTER (OUTPATIENT)
Dept: CARDIOLOGY | Facility: CLINIC | Age: 69
Discharge: HOME | End: 2024-06-04
Payer: MEDICARE

## 2024-06-04 DIAGNOSIS — Z95.810 PRESENCE OF AUTOMATIC CARDIOVERTER/DEFIBRILLATOR (AICD): ICD-10-CM

## 2024-06-04 DIAGNOSIS — I42.0 CONGESTIVE CARDIOMYOPATHY (MULTI): ICD-10-CM

## 2024-06-04 PROCEDURE — RXMED WILLOW AMBULATORY MEDICATION CHARGE

## 2024-06-05 ENCOUNTER — PHARMACY VISIT (OUTPATIENT)
Dept: PHARMACY | Facility: CLINIC | Age: 69
End: 2024-06-05
Payer: COMMERCIAL

## 2024-06-06 ENCOUNTER — LAB (OUTPATIENT)
Dept: LAB | Facility: LAB | Age: 69
End: 2024-06-06
Payer: MEDICARE

## 2024-06-06 DIAGNOSIS — E11.40 TYPE 2 DIABETES MELLITUS WITH DIABETIC NEUROPATHY, UNSPECIFIED (MULTI): Primary | ICD-10-CM

## 2024-06-06 LAB
EST. AVERAGE GLUCOSE BLD GHB EST-MCNC: 169 MG/DL
HBA1C MFR BLD: 7.5 %

## 2024-06-07 ENCOUNTER — PHARMACY VISIT (OUTPATIENT)
Dept: PHARMACY | Facility: CLINIC | Age: 69
End: 2024-06-07
Payer: COMMERCIAL

## 2024-06-07 PROCEDURE — RXMED WILLOW AMBULATORY MEDICATION CHARGE

## 2024-06-07 RX ORDER — WARFARIN 7.5 MG/1
TABLET ORAL
Qty: 30 TABLET | Refills: 2 | OUTPATIENT
Start: 2024-06-07 | End: 2025-06-04

## 2024-06-20 PROCEDURE — RXMED WILLOW AMBULATORY MEDICATION CHARGE

## 2024-06-22 ENCOUNTER — PHARMACY VISIT (OUTPATIENT)
Dept: PHARMACY | Facility: CLINIC | Age: 69
End: 2024-06-22
Payer: COMMERCIAL

## 2024-06-24 PROCEDURE — RXMED WILLOW AMBULATORY MEDICATION CHARGE

## 2024-06-27 ENCOUNTER — PHARMACY VISIT (OUTPATIENT)
Dept: PHARMACY | Facility: CLINIC | Age: 69
End: 2024-06-27
Payer: COMMERCIAL

## 2024-07-08 PROCEDURE — RXMED WILLOW AMBULATORY MEDICATION CHARGE

## 2024-07-10 ENCOUNTER — PHARMACY VISIT (OUTPATIENT)
Dept: PHARMACY | Facility: CLINIC | Age: 69
End: 2024-07-10
Payer: COMMERCIAL

## 2024-08-04 PROCEDURE — RXMED WILLOW AMBULATORY MEDICATION CHARGE

## 2024-08-05 PROCEDURE — RXMED WILLOW AMBULATORY MEDICATION CHARGE

## 2024-08-07 ENCOUNTER — PHARMACY VISIT (OUTPATIENT)
Dept: PHARMACY | Facility: CLINIC | Age: 69
End: 2024-08-07
Payer: COMMERCIAL

## 2024-08-22 ENCOUNTER — PHARMACY VISIT (OUTPATIENT)
Dept: PHARMACY | Facility: CLINIC | Age: 69
End: 2024-08-22
Payer: COMMERCIAL

## 2024-08-22 PROCEDURE — RXMED WILLOW AMBULATORY MEDICATION CHARGE

## 2024-08-25 DIAGNOSIS — I50.22 CHRONIC SYSTOLIC CONGESTIVE HEART FAILURE (MULTI): ICD-10-CM

## 2024-08-25 PROCEDURE — RXMED WILLOW AMBULATORY MEDICATION CHARGE

## 2024-08-26 ENCOUNTER — PHARMACY VISIT (OUTPATIENT)
Dept: PHARMACY | Facility: CLINIC | Age: 69
End: 2024-08-26
Payer: COMMERCIAL

## 2024-08-26 PROCEDURE — RXMED WILLOW AMBULATORY MEDICATION CHARGE

## 2024-08-26 RX ORDER — CARVEDILOL 3.12 MG/1
3.12 TABLET ORAL
Qty: 180 TABLET | Refills: 2 | Status: SHIPPED | OUTPATIENT
Start: 2024-08-26 | End: 2025-05-23

## 2024-08-27 PROCEDURE — RXMED WILLOW AMBULATORY MEDICATION CHARGE

## 2024-08-29 ENCOUNTER — PHARMACY VISIT (OUTPATIENT)
Dept: PHARMACY | Facility: CLINIC | Age: 69
End: 2024-08-29
Payer: COMMERCIAL

## 2024-09-01 PROCEDURE — RXMED WILLOW AMBULATORY MEDICATION CHARGE

## 2024-09-03 PROCEDURE — RXMED WILLOW AMBULATORY MEDICATION CHARGE

## 2024-09-05 ENCOUNTER — PHARMACY VISIT (OUTPATIENT)
Dept: PHARMACY | Facility: CLINIC | Age: 69
End: 2024-09-05
Payer: COMMERCIAL

## 2024-09-18 PROCEDURE — RXMED WILLOW AMBULATORY MEDICATION CHARGE

## 2024-09-19 ENCOUNTER — PHARMACY VISIT (OUTPATIENT)
Dept: PHARMACY | Facility: CLINIC | Age: 69
End: 2024-09-19
Payer: COMMERCIAL

## 2024-10-01 PROCEDURE — RXMED WILLOW AMBULATORY MEDICATION CHARGE

## 2024-10-03 ENCOUNTER — PHARMACY VISIT (OUTPATIENT)
Dept: PHARMACY | Facility: CLINIC | Age: 69
End: 2024-10-03
Payer: COMMERCIAL

## 2024-10-21 ENCOUNTER — HOSPITAL ENCOUNTER (OUTPATIENT)
Dept: CARDIOLOGY | Facility: CLINIC | Age: 69
Discharge: HOME | End: 2024-10-21
Payer: MEDICARE

## 2024-10-21 DIAGNOSIS — I42.0 DILATED CARDIOMYOPATHY (MULTI): ICD-10-CM

## 2024-10-21 DIAGNOSIS — Z95.810 PRESENCE OF AUTOMATIC (IMPLANTABLE) CARDIAC DEFIBRILLATOR: ICD-10-CM

## 2024-10-21 PROCEDURE — 93296 REM INTERROG EVL PM/IDS: CPT

## 2024-10-21 PROCEDURE — 93295 DEV INTERROG REMOTE 1/2/MLT: CPT | Performed by: INTERNAL MEDICINE

## 2024-10-29 PROCEDURE — RXMED WILLOW AMBULATORY MEDICATION CHARGE

## 2024-10-30 ENCOUNTER — PHARMACY VISIT (OUTPATIENT)
Dept: PHARMACY | Facility: CLINIC | Age: 69
End: 2024-10-30
Payer: COMMERCIAL

## 2024-11-02 PROCEDURE — RXMED WILLOW AMBULATORY MEDICATION CHARGE

## 2024-11-07 ENCOUNTER — PHARMACY VISIT (OUTPATIENT)
Dept: PHARMACY | Facility: CLINIC | Age: 69
End: 2024-11-07
Payer: COMMERCIAL

## 2024-11-13 PROCEDURE — RXMED WILLOW AMBULATORY MEDICATION CHARGE

## 2024-11-14 ENCOUNTER — PHARMACY VISIT (OUTPATIENT)
Dept: PHARMACY | Facility: CLINIC | Age: 69
End: 2024-11-14
Payer: COMMERCIAL

## 2024-11-16 PROCEDURE — RXMED WILLOW AMBULATORY MEDICATION CHARGE

## 2024-11-20 ENCOUNTER — PHARMACY VISIT (OUTPATIENT)
Dept: PHARMACY | Facility: CLINIC | Age: 69
End: 2024-11-20
Payer: COMMERCIAL

## 2024-11-22 DIAGNOSIS — E78.5 HYPERLIPIDEMIA, UNSPECIFIED HYPERLIPIDEMIA TYPE: ICD-10-CM

## 2024-11-22 RX ORDER — ATORVASTATIN CALCIUM 40 MG/1
40 TABLET, FILM COATED ORAL DAILY
Qty: 30 TABLET | Refills: 11 | OUTPATIENT
Start: 2024-11-22 | End: 2025-11-22

## 2024-11-26 ENCOUNTER — HOSPITAL ENCOUNTER (OUTPATIENT)
Dept: CARDIOLOGY | Facility: CLINIC | Age: 69
Discharge: HOME | End: 2024-11-26
Payer: MEDICARE

## 2024-11-26 DIAGNOSIS — I42.0 DILATED CARDIOMYOPATHY (MULTI): ICD-10-CM

## 2024-11-26 DIAGNOSIS — Z95.810 PRESENCE OF AUTOMATIC (IMPLANTABLE) CARDIAC DEFIBRILLATOR: ICD-10-CM

## 2024-11-27 PROCEDURE — RXMED WILLOW AMBULATORY MEDICATION CHARGE

## 2024-12-02 ENCOUNTER — PHARMACY VISIT (OUTPATIENT)
Dept: PHARMACY | Facility: CLINIC | Age: 69
End: 2024-12-02
Payer: COMMERCIAL

## 2024-12-10 DIAGNOSIS — E78.5 HYPERLIPIDEMIA, UNSPECIFIED HYPERLIPIDEMIA TYPE: ICD-10-CM

## 2024-12-10 PROCEDURE — RXMED WILLOW AMBULATORY MEDICATION CHARGE

## 2024-12-10 RX ORDER — ATORVASTATIN CALCIUM 40 MG/1
40 TABLET, FILM COATED ORAL DAILY
Qty: 30 TABLET | Refills: 2 | Status: SHIPPED | OUTPATIENT
Start: 2024-12-10

## 2024-12-12 ENCOUNTER — PHARMACY VISIT (OUTPATIENT)
Dept: PHARMACY | Facility: CLINIC | Age: 69
End: 2024-12-12
Payer: COMMERCIAL

## 2024-12-23 ENCOUNTER — HOSPITAL ENCOUNTER (OUTPATIENT)
Dept: CARDIOLOGY | Facility: CLINIC | Age: 69
Discharge: HOME | End: 2024-12-23
Payer: MEDICARE

## 2024-12-23 DIAGNOSIS — Z95.810 PRESENCE OF AUTOMATIC (IMPLANTABLE) CARDIAC DEFIBRILLATOR: ICD-10-CM

## 2024-12-23 DIAGNOSIS — I42.0 DILATED CARDIOMYOPATHY (MULTI): ICD-10-CM

## 2024-12-27 DIAGNOSIS — I50.22 CHRONIC SYSTOLIC HEART FAILURE: ICD-10-CM

## 2024-12-27 DIAGNOSIS — I47.20 VENTRICULAR TACHYARRHYTHMIA (MULTI): ICD-10-CM

## 2024-12-27 DIAGNOSIS — I50.22 CHRONIC SYSTOLIC CONGESTIVE HEART FAILURE: ICD-10-CM

## 2024-12-27 DIAGNOSIS — E78.5 HYPERLIPIDEMIA, UNSPECIFIED HYPERLIPIDEMIA TYPE: ICD-10-CM

## 2024-12-27 RX ORDER — SACUBITRIL AND VALSARTAN 49; 51 MG/1; MG/1
1 TABLET, FILM COATED ORAL 2 TIMES DAILY
Qty: 180 TABLET | Refills: 0 | Status: SHIPPED | OUTPATIENT
Start: 2024-12-27 | End: 2025-03-27

## 2024-12-27 RX ORDER — DAPAGLIFLOZIN 5 MG/1
5 TABLET, FILM COATED ORAL DAILY
Qty: 90 TABLET | Refills: 0 | Status: SHIPPED | OUTPATIENT
Start: 2024-12-27 | End: 2025-04-03

## 2024-12-27 RX ORDER — AMIODARONE HYDROCHLORIDE 200 MG/1
100 TABLET ORAL DAILY
Qty: 15 TABLET | Refills: 0 | Status: SHIPPED | OUTPATIENT
Start: 2024-12-27 | End: 2025-12-27

## 2024-12-27 RX ORDER — WARFARIN 7.5 MG/1
TABLET ORAL
Qty: 30 TABLET | Refills: 0 | Status: SHIPPED | OUTPATIENT
Start: 2024-12-27 | End: 2025-12-27

## 2024-12-27 RX ORDER — CARVEDILOL 3.12 MG/1
3.12 TABLET ORAL
Qty: 60 TABLET | Refills: 0 | Status: SHIPPED | OUTPATIENT
Start: 2024-12-27 | End: 2025-09-23

## 2024-12-27 RX ORDER — ATORVASTATIN CALCIUM 40 MG/1
40 TABLET, FILM COATED ORAL DAILY
Qty: 30 TABLET | Refills: 0 | Status: SHIPPED | OUTPATIENT
Start: 2024-12-27

## 2024-12-27 RX ORDER — SACUBITRIL AND VALSARTAN 49; 51 MG/1; MG/1
1 TABLET, FILM COATED ORAL 2 TIMES DAILY
Qty: 60 TABLET | Refills: 0 | Status: SHIPPED | OUTPATIENT
Start: 2024-12-27 | End: 2025-01-26

## 2024-12-27 RX ORDER — NAPROXEN SODIUM 220 MG/1
81 TABLET, FILM COATED ORAL DAILY
Qty: 30 TABLET | Refills: 0 | Status: SHIPPED | OUTPATIENT
Start: 2024-12-27

## 2024-12-28 PROCEDURE — RXMED WILLOW AMBULATORY MEDICATION CHARGE

## 2025-01-03 ENCOUNTER — PHARMACY VISIT (OUTPATIENT)
Dept: PHARMACY | Facility: CLINIC | Age: 70
End: 2025-01-03
Payer: COMMERCIAL

## 2025-01-04 PROCEDURE — RXMED WILLOW AMBULATORY MEDICATION CHARGE

## 2025-01-05 PROCEDURE — RXMED WILLOW AMBULATORY MEDICATION CHARGE

## 2025-01-07 ENCOUNTER — PHARMACY VISIT (OUTPATIENT)
Dept: PHARMACY | Facility: CLINIC | Age: 70
End: 2025-01-07
Payer: COMMERCIAL

## 2025-01-09 ENCOUNTER — PHARMACY VISIT (OUTPATIENT)
Dept: PHARMACY | Facility: CLINIC | Age: 70
End: 2025-01-09
Payer: COMMERCIAL

## 2025-01-27 ENCOUNTER — HOSPITAL ENCOUNTER (OUTPATIENT)
Dept: CARDIOLOGY | Facility: CLINIC | Age: 70
Discharge: HOME | End: 2025-01-27
Payer: MEDICARE

## 2025-01-27 DIAGNOSIS — I42.0 DILATED CARDIOMYOPATHY (MULTI): ICD-10-CM

## 2025-01-27 DIAGNOSIS — Z95.810 PRESENCE OF AUTOMATIC (IMPLANTABLE) CARDIAC DEFIBRILLATOR: ICD-10-CM

## 2025-01-27 PROCEDURE — 93296 REM INTERROG EVL PM/IDS: CPT

## 2025-01-27 PROCEDURE — RXMED WILLOW AMBULATORY MEDICATION CHARGE

## 2025-01-30 ENCOUNTER — PHARMACY VISIT (OUTPATIENT)
Dept: PHARMACY | Facility: CLINIC | Age: 70
End: 2025-01-30
Payer: COMMERCIAL

## 2025-02-03 DIAGNOSIS — E78.5 HYPERLIPIDEMIA, UNSPECIFIED HYPERLIPIDEMIA TYPE: ICD-10-CM

## 2025-02-03 PROCEDURE — RXMED WILLOW AMBULATORY MEDICATION CHARGE

## 2025-02-03 RX ORDER — ATORVASTATIN CALCIUM 40 MG/1
40 TABLET, FILM COATED ORAL DAILY
Qty: 30 TABLET | Refills: 0 | Status: SHIPPED | OUTPATIENT
Start: 2025-02-03

## 2025-02-05 PROCEDURE — RXMED WILLOW AMBULATORY MEDICATION CHARGE

## 2025-02-06 ENCOUNTER — PHARMACY VISIT (OUTPATIENT)
Dept: PHARMACY | Facility: CLINIC | Age: 70
End: 2025-02-06
Payer: COMMERCIAL

## 2025-02-13 PROCEDURE — RXMED WILLOW AMBULATORY MEDICATION CHARGE

## 2025-02-14 ENCOUNTER — PHARMACY VISIT (OUTPATIENT)
Dept: PHARMACY | Facility: CLINIC | Age: 70
End: 2025-02-14
Payer: COMMERCIAL

## 2025-02-25 ENCOUNTER — TELEMEDICINE (OUTPATIENT)
Dept: CARDIOLOGY | Facility: HOSPITAL | Age: 70
End: 2025-02-25
Payer: MEDICARE

## 2025-02-25 DIAGNOSIS — K21.9 GASTROESOPHAGEAL REFLUX DISEASE, UNSPECIFIED WHETHER ESOPHAGITIS PRESENT: ICD-10-CM

## 2025-02-25 DIAGNOSIS — Z00.00 HEALTHCARE MAINTENANCE: ICD-10-CM

## 2025-02-25 DIAGNOSIS — E13.69 OTHER SPECIFIED DIABETES MELLITUS WITH OTHER SPECIFIED COMPLICATION, UNSPECIFIED WHETHER LONG TERM INSULIN USE (MULTI): ICD-10-CM

## 2025-02-25 DIAGNOSIS — I50.22 CHRONIC SYSTOLIC CONGESTIVE HEART FAILURE: Primary | ICD-10-CM

## 2025-02-25 DIAGNOSIS — I47.20 VENTRICULAR TACHYARRHYTHMIA (MULTI): ICD-10-CM

## 2025-02-25 PROCEDURE — RXMED WILLOW AMBULATORY MEDICATION CHARGE

## 2025-02-25 PROCEDURE — 99213 OFFICE O/P EST LOW 20 MIN: CPT | Performed by: INTERNAL MEDICINE

## 2025-02-25 RX ORDER — AMIODARONE HYDROCHLORIDE 200 MG/1
100 TABLET ORAL DAILY
Qty: 15 TABLET | Refills: 11 | Status: SHIPPED | OUTPATIENT
Start: 2025-02-25 | End: 2026-02-25

## 2025-02-25 NOTE — PROGRESS NOTES
69  year old woman with the PMH significant for  HFrEF , CAD s/p PCI to her Lcx in 2019 , s/p CRT in 2019 , antiphospholipid Ab syndrome c/b DVT/PE s/p [place] filter '98, hypothyroid, PALAK on CPAP, CKD, NVST and VT who I last saw in 2022 but who has been following with Swati Molina regularly .   She was admitted in August 2023 with Vfib and ICD firing . During this admission, echo was stable from last with LVEF 30% and V/Q scan negative for pulmonary embolism. Patient was unable to complete cardiac MRI due to claustrophobia. [facility] on 7/31/23, which showed non-obstructive coronary artery disease.   Today's visit is a telephone conversation. Reports doing well , upset that is she unable to get in touch with her PCP Dr Shireen Bautista and does not have refills on glipizide and omeprazole   Denies having CV complaints like chest pain /pressure   Is not SOB at rest or on exertion   No leg edema , no orthopnea , no PND, no palpitations or ICD shocks   [facility] ( August 2023 )   Non obstructive CAD      Echocardiogram ( July 2023)   1. Left ventricular systolic function is moderately decreased with a 35-40% estimated ejection fraction.  2. Entire lateral wall, basal and mid inferior wall, and entire inferior septum are abnormal.  3. Moderately increased left ventricular septal thickness.  4. No left ventricular thrombus visualized.  5. There is severe eccentric left ventricular hypertrophy.  6. The left atrium is moderate to severely dilated.  7. Mild to moderate mitral valve regurgitation.  8. There is moderate aortic valve cusp calcification.  9. Left ventricular cavity size is severely dilated.        #HFrEF  On carvedilol 3.125 mg BID , lasix 20 mg daily and Jardiance 5 mg daily and entresto 49-51 mg BID - no MRA secondary to hyperkalemia   Repeat echocardiogram in 6 months        #history of VT and Vfib with ICD firing   On amidarone 100 mg daily   Follows with Dr Rocha      #CAD s/p PCI to the Lcx in 2019   Non  obstructive CAD on her most recent LHC from August 2023   Continue ASA and Statin      #PE and DVT s/p IVC filter in the setting of antiphospholipid antibodies   Continue anticoagulation      #referral to a new PCP - refills her needed medications until she establishes care

## 2025-02-27 ENCOUNTER — PHARMACY VISIT (OUTPATIENT)
Dept: PHARMACY | Facility: CLINIC | Age: 70
End: 2025-02-27
Payer: COMMERCIAL

## 2025-03-03 ENCOUNTER — HOSPITAL ENCOUNTER (OUTPATIENT)
Dept: CARDIOLOGY | Facility: CLINIC | Age: 70
Discharge: HOME | End: 2025-03-03
Payer: MEDICARE

## 2025-03-03 DIAGNOSIS — I42.0 DILATED CARDIOMYOPATHY (MULTI): ICD-10-CM

## 2025-03-03 DIAGNOSIS — Z95.810 PRESENCE OF AUTOMATIC (IMPLANTABLE) CARDIAC DEFIBRILLATOR: ICD-10-CM

## 2025-03-05 DIAGNOSIS — E78.5 HYPERLIPIDEMIA, UNSPECIFIED HYPERLIPIDEMIA TYPE: ICD-10-CM

## 2025-03-05 PROCEDURE — RXMED WILLOW AMBULATORY MEDICATION CHARGE

## 2025-03-05 RX ORDER — ATORVASTATIN CALCIUM 40 MG/1
40 TABLET, FILM COATED ORAL DAILY
Qty: 30 TABLET | Refills: 11 | Status: SHIPPED | OUTPATIENT
Start: 2025-03-05 | End: 2026-03-05

## 2025-03-06 ENCOUNTER — PHARMACY VISIT (OUTPATIENT)
Dept: PHARMACY | Facility: CLINIC | Age: 70
End: 2025-03-06
Payer: COMMERCIAL

## 2025-03-06 RX ORDER — GLIPIZIDE 10 MG/1
TABLET, FILM COATED, EXTENDED RELEASE ORAL
Qty: 90 TABLET | Refills: 0 | Status: SHIPPED | OUTPATIENT
Start: 2025-03-06 | End: 2025-06-05

## 2025-03-06 RX ORDER — OMEPRAZOLE 40 MG/1
40 CAPSULE, DELAYED RELEASE ORAL
Qty: 90 CAPSULE | Refills: 0 | Status: SHIPPED | OUTPATIENT
Start: 2025-03-06 | End: 2025-06-04

## 2025-03-07 ENCOUNTER — PHARMACY VISIT (OUTPATIENT)
Dept: PHARMACY | Facility: CLINIC | Age: 70
End: 2025-03-07
Payer: COMMERCIAL

## 2025-03-07 PROCEDURE — RXMED WILLOW AMBULATORY MEDICATION CHARGE

## 2025-03-24 PROCEDURE — RXMED WILLOW AMBULATORY MEDICATION CHARGE

## 2025-03-25 ENCOUNTER — PHARMACY VISIT (OUTPATIENT)
Dept: PHARMACY | Facility: CLINIC | Age: 70
End: 2025-03-25
Payer: COMMERCIAL

## 2025-03-27 DIAGNOSIS — I50.22 CHRONIC SYSTOLIC HEART FAILURE: ICD-10-CM

## 2025-03-27 PROCEDURE — RXMED WILLOW AMBULATORY MEDICATION CHARGE

## 2025-03-27 RX ORDER — DAPAGLIFLOZIN 5 MG/1
5 TABLET, FILM COATED ORAL DAILY
Qty: 90 TABLET | Refills: 3 | Status: SHIPPED | OUTPATIENT
Start: 2025-03-27 | End: 2026-03-27

## 2025-03-31 ENCOUNTER — PHARMACY VISIT (OUTPATIENT)
Dept: PHARMACY | Facility: CLINIC | Age: 70
End: 2025-03-31
Payer: COMMERCIAL

## 2025-03-31 PROCEDURE — RXMED WILLOW AMBULATORY MEDICATION CHARGE

## 2025-04-01 ENCOUNTER — PHARMACY VISIT (OUTPATIENT)
Dept: PHARMACY | Facility: CLINIC | Age: 70
End: 2025-04-01
Payer: COMMERCIAL

## 2025-04-07 ENCOUNTER — HOSPITAL ENCOUNTER (OUTPATIENT)
Dept: CARDIOLOGY | Facility: CLINIC | Age: 70
Discharge: HOME | End: 2025-04-07
Payer: MEDICARE

## 2025-04-07 DIAGNOSIS — I42.0 DILATED CARDIOMYOPATHY (MULTI): ICD-10-CM

## 2025-04-07 DIAGNOSIS — Z95.810 PRESENCE OF AUTOMATIC (IMPLANTABLE) CARDIAC DEFIBRILLATOR: ICD-10-CM

## 2025-04-07 DIAGNOSIS — I50.22 CHRONIC SYSTOLIC CONGESTIVE HEART FAILURE: ICD-10-CM

## 2025-04-07 PROCEDURE — 93306 TTE W/DOPPLER COMPLETE: CPT

## 2025-04-07 PROCEDURE — 93306 TTE W/DOPPLER COMPLETE: CPT | Performed by: INTERNAL MEDICINE

## 2025-04-08 LAB
AORTIC VALVE PEAK VELOCITY: 1.54 M/S
AV PEAK GRADIENT: 9 MMHG
AVA (PEAK VEL): 2.48 CM2
EJECTION FRACTION APICAL 4 CHAMBER: 43.7
EJECTION FRACTION: 43 %
LEFT ATRIUM VOLUME AREA LENGTH INDEX BSA: 71.6 ML/M2
LEFT VENTRICLE INTERNAL DIMENSION DIASTOLE: 6.66 CM (ref 3.5–6)
LEFT VENTRICULAR OUTFLOW TRACT DIAMETER: 2.24 CM
MITRAL VALVE E/A RATIO: 0.94
RIGHT VENTRICLE FREE WALL PEAK S': 14.12 CM/S
TRICUSPID ANNULAR PLANE SYSTOLIC EXCURSION: 2 CM

## 2025-04-15 ENCOUNTER — SOCIAL WORK (OUTPATIENT)
Dept: CARDIOLOGY | Facility: HOSPITAL | Age: 70
End: 2025-04-15
Payer: MEDICARE

## 2025-04-15 ENCOUNTER — OFFICE VISIT (OUTPATIENT)
Dept: CARDIOLOGY | Facility: HOSPITAL | Age: 70
End: 2025-04-15
Payer: MEDICARE

## 2025-04-15 VITALS
RESPIRATION RATE: 16 BRPM | OXYGEN SATURATION: 98 % | DIASTOLIC BLOOD PRESSURE: 77 MMHG | HEIGHT: 64 IN | SYSTOLIC BLOOD PRESSURE: 137 MMHG | BODY MASS INDEX: 34.18 KG/M2 | WEIGHT: 200.2 LBS | HEART RATE: 77 BPM

## 2025-04-15 DIAGNOSIS — I50.22 CHRONIC SYSTOLIC CONGESTIVE HEART FAILURE: ICD-10-CM

## 2025-04-15 PROCEDURE — 3075F SYST BP GE 130 - 139MM HG: CPT | Performed by: INTERNAL MEDICINE

## 2025-04-15 PROCEDURE — 1159F MED LIST DOCD IN RCRD: CPT | Performed by: INTERNAL MEDICINE

## 2025-04-15 PROCEDURE — RXMED WILLOW AMBULATORY MEDICATION CHARGE

## 2025-04-15 PROCEDURE — 99214 OFFICE O/P EST MOD 30 MIN: CPT | Performed by: INTERNAL MEDICINE

## 2025-04-15 PROCEDURE — 1126F AMNT PAIN NOTED NONE PRSNT: CPT | Performed by: INTERNAL MEDICINE

## 2025-04-15 PROCEDURE — 3078F DIAST BP <80 MM HG: CPT | Performed by: INTERNAL MEDICINE

## 2025-04-15 PROCEDURE — 3008F BODY MASS INDEX DOCD: CPT | Performed by: INTERNAL MEDICINE

## 2025-04-15 RX ORDER — FUROSEMIDE 20 MG/1
20 TABLET ORAL
Qty: 90 TABLET | Refills: 3 | Status: SHIPPED | OUTPATIENT
Start: 2025-04-15

## 2025-04-15 RX ORDER — CARVEDILOL 6.25 MG/1
6.25 TABLET ORAL
Qty: 180 TABLET | Refills: 3 | Status: SHIPPED | OUTPATIENT
Start: 2025-04-15 | End: 2026-04-15

## 2025-04-15 ASSESSMENT — COLUMBIA-SUICIDE SEVERITY RATING SCALE - C-SSRS
1. IN THE PAST MONTH, HAVE YOU WISHED YOU WERE DEAD OR WISHED YOU COULD GO TO SLEEP AND NOT WAKE UP?: NO
2. HAVE YOU ACTUALLY HAD ANY THOUGHTS OF KILLING YOURSELF?: NO
6. HAVE YOU EVER DONE ANYTHING, STARTED TO DO ANYTHING, OR PREPARED TO DO ANYTHING TO END YOUR LIFE?: NO

## 2025-04-15 ASSESSMENT — ENCOUNTER SYMPTOMS
DEPRESSION: 0
OCCASIONAL FEELINGS OF UNSTEADINESS: 1
LOSS OF SENSATION IN FEET: 0

## 2025-04-15 ASSESSMENT — PAIN SCALES - GENERAL: PAINLEVEL_OUTOF10: 0-NO PAIN

## 2025-04-15 NOTE — PATIENT INSTRUCTIONS
To reach Dr. Chery's office please call [phone] (Kayla). Fax [phone]. Call [phone] to schedule an appointment. You may also contact the HF RNs at [email or web address]    Thank you for coming to your appointment today. If you have any questions or need cardiac medication refills, please call the Heart Failure Office at [phone] option [phone].     INCREASE Carvedilol to 6.25mg one tablet twice daily  Take you Lasix 20mg daily   Have labs drawn in 7-10 days  Will re-check a limited echo in 6 months. Call [phone]   We will refer you to Internal Medicine. Call [phone]   Follow up with Dr. Chery in 6 months after your echo

## 2025-04-15 NOTE — PROGRESS NOTES
Chief Complaint    SHEILA JAMES is here for a follow up visit     History of Present Illness  69 year old woman with the PMH significant for  HFrEF , CAD s/p PCI to her Lcx in 2019 , s/p CRT in 2019 , antiphospholipid Ab syndrome c/b DVT/PE s/p [place] filter '98, hypothyroid, PALAK on CPAP, CKD, NVST and VT .  I had seen her virtually during her last visit and asked to see her in person after her repeat echocardiogram showed an increase in her MR from mild to moderate to moderate to severe .   In regards to symptoms , she denies chest pain, shortness of breath, dyspnea on exertion, orthopnea.  Does endorse not taking her loop diuretics .     Hospitalizations: 6/1-6/3/25 Chest pain       LHC ( August 2023 )   Non obstructive CAD     Echocardiogram ( April 2025)     CONCLUSIONS:   1. The left ventricular systolic function is mildly decreased, with a visually estimated ejection fraction of 40-45%.   2. Abnormal left venticular wall motion.   3. Left ventricular cavity size is moderately dilated.   4. Abnormal septal motion consistent with RV pacemaker.   5. There is hypertrophic cardiomyopathy.   6. There is severe hypokinesis of the inferoposterior wall.   7. The left atrial size is moderate to severely dilated.   8. Moderate to severe mitral valve regurgitation.   9. Aortic valve sclerosis.  10. There is moderate to severe left ventricular hypertrophy.  11. There is plaque visualized in the ascending aorta.    Vitals:    04/15/25 1316   BP: 137/77   Pulse: 77   Resp: 16   SpO2: 98%          On Physical exam   Gen : NAD , Aox3  HEENT : JVP at around 1 cm above the clavicle   Heart : regular , no mumurs   Lungs :Clear resonant , normal air entry bilaterally  Abdomen : soft , non tender   Ext : warm , well perfused ,+1 pitting edema bilaterally     Current Outpatient Medications   Medication Instructions    albuterol 2.5 mg /3 mL (0.083 %) nebulizer solution Every 8 hours PRN    amiodarone (PACERONE) 100 mg, oral,  Daily    aspirin 81 mg, oral, Daily    atorvastatin (LIPITOR) 40 mg, oral, Daily    carvedilol (Coreg) 3.125 mg tablet Take 1 tablet (3.125 mg) by mouth 2 times a day with meals.    Farxiga 5 mg, oral, Daily    fluticasone (Flovent HFA) 220 mcg/actuation inhaler 2 puffs, 2 times daily    furosemide (Lasix) 20 mg tablet take one tablet by mouth every morning    furosemide (LASIX) 40 mg, oral, Daily    glipiZIDE XL (Glucotrol XL) 10 mg 24 hr tablet Take 1 tablet by mouth every day    glipiZIDE XL (GLUCOTROL XL) 5 mg, oral, Daily    insulin glargine (Lantus Solostar U-100 Insulin) 100 unit/mL (3 mL) pen inject 22 units daily at bedtime    liraglutide (Victoza 2-Noah) 0.6 mg/0.1 mL (18 mg/3 mL) injection Inject 1.8mg subcutaneously daily    omeprazole (PriLOSEC) 40 mg DR capsule TAKE ONE CAPSULE BY MOUTH EVERY DAY IN THE MORNING    omeprazole (PRILOSEC) 40 mg, oral, Daily before breakfast    sacubitriL-valsartan (Entresto) 49-51 mg tablet 1 tablet, oral, 2 times daily    sacubitriL-valsartan (Entresto) 49-51 mg tablet 1 tablet, oral, 2 times daily    warfarin (Coumadin) 7.5 mg tablet take one 1/2 tablet by mouth other than wed & saturday whole pill    warfarin (Coumadin) 7.5 mg tablet TAKE 1/2 TABLET BY MOUTH ONCE DAILY EXCEPT FOR WEDNESDAYS AND SATURDAY TAKE A FULL TABLET            A/P  69 year old woman with the PMH significant for  HFrEF , CAD s/p PCI to her Lcx in 2019 , s/p CRT in 2019 , antiphospholipid Ab syndrome c/b DVT/PE s/p [place] filter '98, hypothyroid, PALAK on CPAP, CKD, NVST and VT who I last saw in 2022 but who has been following with Swati Jesse regularly .   She was admitted in August 2023 with Vfib and ICD firing . During this admission, echo was stable from last with LVEF 30% and V/Q scan negative for pulmonary embolism. Patient was unable to complete cardiac MRI due to claustrophobia. [facility] on 7/31/23, which showed non-obstructive coronary artery disease.   She is here for a follow up visit         Mildly hypervolemic on examination and hypertensive     #HFrEF       Will increase carvedilol to 6.25 mg BID    Continue the rest of her GDMT with entresto 49-51 mg BID and farxiga 5 mg every day   Not on MRA secondary to prior episodes of hyperkalemia     #Mitral regurgitation   Increased severity on echocardiogram   Has not been taking her lasix , advised her to resume it   Hypertensive , will increase carvedilol to 6.25 mg BID   Repeat an echocardiogram in 6 months to assess MR severity     #history of VT and Vfib with ICD firing   On amidarone 100 mg daily   Follows with Dr Rocha     #CAD s/p PCI to the Lcx in 2019   Non obstructive CAD on her most recent LHC from August 2023   Continue ASA and Statin     #PE and DVT s/p IVC filter in the setting of antiphospholipid antibodies   Continue anticoagulation     # Referral to Internal Medicine for her to reestablish care , her PCP Dr Karen Bautista has not been reachable     Follow up in 6 months

## 2025-04-15 NOTE — PROGRESS NOTES
Met with Ms. Ravi following her visit with the cardiologist.  She is considering a move to a 55+ community and has identified one in [place], [facility].  She started at #25 on the wait list and has moved to #15 within a month.  She likes the community and location, though it is farther out than where she currently lives.  Has been in her current home since 1960, it belonged to her parents.  She recognizes that the layout of the home is no longer conducive to her changing needs with steps, kitchen on the first floor, bathroom on the second floor.  Stated someone had made an APS referral due to the leaking roof which has since been replaced.  Discussed other 55+ type environments and sounds like she has done her research. Citing reasons why she wouldn't feel comfortable in communities closer to her current home.  Provided business card and encouraged to reach out if anything changes with [facility], if she would like to explore other options, or if other needs arise.

## 2025-04-16 ENCOUNTER — PHARMACY VISIT (OUTPATIENT)
Dept: PHARMACY | Facility: CLINIC | Age: 70
End: 2025-04-16
Payer: COMMERCIAL

## 2025-04-21 ENCOUNTER — CLINICAL SUPPORT (OUTPATIENT)
Dept: EMERGENCY MEDICINE | Facility: HOSPITAL | Age: 70
End: 2025-04-21
Payer: MEDICARE

## 2025-04-21 ENCOUNTER — APPOINTMENT (OUTPATIENT)
Dept: RADIOLOGY | Facility: HOSPITAL | Age: 70
End: 2025-04-21
Payer: MEDICARE

## 2025-04-21 ENCOUNTER — HOSPITAL ENCOUNTER (OUTPATIENT)
Facility: HOSPITAL | Age: 70
Setting detail: OBSERVATION
LOS: 1 days | Discharge: HOME | End: 2025-04-22
Attending: STUDENT IN AN ORGANIZED HEALTH CARE EDUCATION/TRAINING PROGRAM | Admitting: STUDENT IN AN ORGANIZED HEALTH CARE EDUCATION/TRAINING PROGRAM
Payer: MEDICARE

## 2025-04-21 DIAGNOSIS — K92.2 LOWER GI BLEED: Primary | ICD-10-CM

## 2025-04-21 DIAGNOSIS — I50.22 CHRONIC SYSTOLIC CONGESTIVE HEART FAILURE: ICD-10-CM

## 2025-04-21 DIAGNOSIS — D50.9 IRON DEFICIENCY ANEMIA, UNSPECIFIED IRON DEFICIENCY ANEMIA TYPE: ICD-10-CM

## 2025-04-21 LAB
ABO GROUP (TYPE) IN BLOOD: NORMAL
ALBUMIN SERPL BCP-MCNC: 3.3 G/DL (ref 3.4–5)
ALP SERPL-CCNC: 60 U/L (ref 33–136)
ALT SERPL W P-5'-P-CCNC: 5 U/L (ref 7–45)
ANION GAP BLDV CALCULATED.4IONS-SCNC: 8 MMOL/L (ref 10–25)
ANION GAP SERPL CALC-SCNC: 9 MMOL/L (ref 10–20)
ANTIBODY SCREEN: NORMAL
APTT PPP: 39 SECONDS (ref 26–36)
AST SERPL W P-5'-P-CCNC: 15 U/L (ref 9–39)
ATRIAL RATE: 82 BPM
BASE EXCESS BLDV CALC-SCNC: 0.9 MMOL/L (ref -2–3)
BASOPHILS # BLD AUTO: 0.03 X10*3/UL (ref 0–0.1)
BASOPHILS # BLD AUTO: 0.03 X10*3/UL (ref 0–0.1)
BASOPHILS NFR BLD AUTO: 0.5 %
BASOPHILS NFR BLD AUTO: 0.5 %
BILIRUB SERPL-MCNC: 0.4 MG/DL (ref 0–1.2)
BODY TEMPERATURE: 37 DEGREES CELSIUS
BUN SERPL-MCNC: 12 MG/DL (ref 6–23)
CA-I BLDV-SCNC: 1.09 MMOL/L (ref 1.1–1.33)
CALCIUM SERPL-MCNC: 7.9 MG/DL (ref 8.6–10.6)
CHLORIDE BLDV-SCNC: 104 MMOL/L (ref 98–107)
CHLORIDE SERPL-SCNC: 105 MMOL/L (ref 98–107)
CO2 SERPL-SCNC: 28 MMOL/L (ref 21–32)
CREAT SERPL-MCNC: 1.51 MG/DL (ref 0.5–1.05)
EGFRCR SERPLBLD CKD-EPI 2021: 37 ML/MIN/1.73M*2
EOSINOPHIL # BLD AUTO: 0.19 X10*3/UL (ref 0–0.7)
EOSINOPHIL # BLD AUTO: 0.22 X10*3/UL (ref 0–0.7)
EOSINOPHIL NFR BLD AUTO: 3 %
EOSINOPHIL NFR BLD AUTO: 3.4 %
ERYTHROCYTE [DISTWIDTH] IN BLOOD BY AUTOMATED COUNT: 14.9 % (ref 11.5–14.5)
ERYTHROCYTE [DISTWIDTH] IN BLOOD BY AUTOMATED COUNT: 15 % (ref 11.5–14.5)
ERYTHROCYTE [DISTWIDTH] IN BLOOD BY AUTOMATED COUNT: 15.2 % (ref 11.5–14.5)
EST. AVERAGE GLUCOSE BLD GHB EST-MCNC: 163 MG/DL
FERRITIN SERPL-MCNC: 22 NG/ML (ref 8–150)
FOLATE SERPL-MCNC: 7.7 NG/ML
GLUCOSE BLD MANUAL STRIP-MCNC: 149 MG/DL (ref 74–99)
GLUCOSE BLDV-MCNC: 136 MG/DL (ref 74–99)
GLUCOSE SERPL-MCNC: 137 MG/DL (ref 74–99)
HBA1C MFR BLD: 7.3 % (ref ?–5.7)
HCO3 BLDV-SCNC: 28.2 MMOL/L (ref 22–26)
HCT VFR BLD AUTO: 33.9 % (ref 36–46)
HCT VFR BLD AUTO: 35.6 % (ref 36–46)
HCT VFR BLD AUTO: 35.7 % (ref 36–46)
HCT VFR BLD EST: 38 % (ref 36–46)
HGB BLD-MCNC: 10.7 G/DL (ref 12–16)
HGB BLD-MCNC: 11 G/DL (ref 12–16)
HGB BLD-MCNC: 11.6 G/DL (ref 12–16)
HGB BLDV-MCNC: 12.5 G/DL (ref 12–16)
IMM GRANULOCYTES # BLD AUTO: 0.01 X10*3/UL (ref 0–0.7)
IMM GRANULOCYTES # BLD AUTO: 0.03 X10*3/UL (ref 0–0.7)
IMM GRANULOCYTES NFR BLD AUTO: 0.2 % (ref 0–0.9)
IMM GRANULOCYTES NFR BLD AUTO: 0.5 % (ref 0–0.9)
INHALED O2 CONCENTRATION: 21 %
INR PPP: 2.2 (ref 0.9–1.1)
IRON SATN MFR SERPL: 18 % (ref 25–45)
IRON SERPL-MCNC: 47 UG/DL (ref 35–150)
LACTATE BLDV-SCNC: 1.1 MMOL/L (ref 0.4–2)
LYMPHOCYTES # BLD AUTO: 2.19 X10*3/UL (ref 1.2–4.8)
LYMPHOCYTES # BLD AUTO: 2.27 X10*3/UL (ref 1.2–4.8)
LYMPHOCYTES NFR BLD AUTO: 34.1 %
LYMPHOCYTES NFR BLD AUTO: 36.3 %
MAGNESIUM SERPL-MCNC: 2.05 MG/DL (ref 1.6–2.4)
MCH RBC QN AUTO: 27.4 PG (ref 26–34)
MCH RBC QN AUTO: 27.5 PG (ref 26–34)
MCH RBC QN AUTO: 28.2 PG (ref 26–34)
MCHC RBC AUTO-ENTMCNC: 30.8 G/DL (ref 32–36)
MCHC RBC AUTO-ENTMCNC: 31.6 G/DL (ref 32–36)
MCHC RBC AUTO-ENTMCNC: 32.6 G/DL (ref 32–36)
MCV RBC AUTO: 87 FL (ref 80–100)
MCV RBC AUTO: 87 FL (ref 80–100)
MCV RBC AUTO: 89 FL (ref 80–100)
MONOCYTES # BLD AUTO: 0.49 X10*3/UL (ref 0.1–1)
MONOCYTES # BLD AUTO: 0.58 X10*3/UL (ref 0.1–1)
MONOCYTES NFR BLD AUTO: 7.6 %
MONOCYTES NFR BLD AUTO: 9.3 %
NEUTROPHILS # BLD AUTO: 3.18 X10*3/UL (ref 1.2–7.7)
NEUTROPHILS # BLD AUTO: 3.46 X10*3/UL (ref 1.2–7.7)
NEUTROPHILS NFR BLD AUTO: 50.7 %
NEUTROPHILS NFR BLD AUTO: 53.9 %
NRBC BLD-RTO: 0 /100 WBCS (ref 0–0)
OXYHGB MFR BLDV: 43.6 % (ref 45–75)
P AXIS: 64 DEGREES
P OFFSET: 147 MS
P ONSET: 125 MS
PCO2 BLDV: 56 MM HG (ref 41–51)
PH BLDV: 7.31 PH (ref 7.33–7.43)
PHOSPHATE SERPL-MCNC: 2.9 MG/DL (ref 2.5–4.9)
PLATELET # BLD AUTO: 192 X10*3/UL (ref 150–450)
PLATELET # BLD AUTO: 194 X10*3/UL (ref 150–450)
PLATELET # BLD AUTO: 195 X10*3/UL (ref 150–450)
PO2 BLDV: 29 MM HG (ref 35–45)
POTASSIUM BLDV-SCNC: 3.9 MMOL/L (ref 3.5–5.3)
POTASSIUM SERPL-SCNC: 4.2 MMOL/L (ref 3.5–5.3)
PR INTERVAL: 106 MS
PROT SERPL-MCNC: 6.1 G/DL (ref 6.4–8.2)
PROTHROMBIN TIME: 24.2 SECONDS (ref 9.8–12.4)
Q ONSET: 178 MS
QRS COUNT: 13 BEATS
QRS DURATION: 168 MS
QT INTERVAL: 518 MS
QTC CALCULATION(BAZETT): 605 MS
QTC FREDERICIA: 574 MS
R AXIS: -89 DEGREES
RBC # BLD AUTO: 3.9 X10*6/UL (ref 4–5.2)
RBC # BLD AUTO: 4 X10*6/UL (ref 4–5.2)
RBC # BLD AUTO: 4.11 X10*6/UL (ref 4–5.2)
RH FACTOR (ANTIGEN D): NORMAL
SAO2 % BLDV: 47 % (ref 45–75)
SODIUM BLDV-SCNC: 136 MMOL/L (ref 136–145)
SODIUM SERPL-SCNC: 138 MMOL/L (ref 136–145)
T AXIS: 113 DEGREES
T OFFSET: 437 MS
TIBC SERPL-MCNC: 259 UG/DL (ref 240–445)
TRANSFERRIN SERPL-MCNC: 201 MG/DL (ref 200–360)
TSH SERPL-ACNC: 1.65 MIU/L (ref 0.44–3.98)
UIBC SERPL-MCNC: 212 UG/DL (ref 110–370)
VENTRICULAR RATE: 82 BPM
WBC # BLD AUTO: 6.3 X10*3/UL (ref 4.4–11.3)
WBC # BLD AUTO: 6.4 X10*3/UL (ref 4.4–11.3)
WBC # BLD AUTO: 6.4 X10*3/UL (ref 4.4–11.3)

## 2025-04-21 PROCEDURE — 2500000001 HC RX 250 WO HCPCS SELF ADMINISTERED DRUGS (ALT 637 FOR MEDICARE OP)

## 2025-04-21 PROCEDURE — 82728 ASSAY OF FERRITIN: CPT

## 2025-04-21 PROCEDURE — 96375 TX/PRO/DX INJ NEW DRUG ADDON: CPT

## 2025-04-21 PROCEDURE — 2500000004 HC RX 250 GENERAL PHARMACY W/ HCPCS (ALT 636 FOR OP/ED): Mod: JZ

## 2025-04-21 PROCEDURE — 85027 COMPLETE CBC AUTOMATED: CPT | Performed by: STUDENT IN AN ORGANIZED HEALTH CARE EDUCATION/TRAINING PROGRAM

## 2025-04-21 PROCEDURE — 85025 COMPLETE CBC W/AUTO DIFF WBC: CPT

## 2025-04-21 PROCEDURE — 36415 COLL VENOUS BLD VENIPUNCTURE: CPT

## 2025-04-21 PROCEDURE — 84100 ASSAY OF PHOSPHORUS: CPT

## 2025-04-21 PROCEDURE — 84443 ASSAY THYROID STIM HORMONE: CPT

## 2025-04-21 PROCEDURE — 84132 ASSAY OF SERUM POTASSIUM: CPT

## 2025-04-21 PROCEDURE — 83540 ASSAY OF IRON: CPT

## 2025-04-21 PROCEDURE — 74176 CT ABD & PELVIS W/O CONTRAST: CPT

## 2025-04-21 PROCEDURE — 86901 BLOOD TYPING SEROLOGIC RH(D): CPT

## 2025-04-21 PROCEDURE — 83735 ASSAY OF MAGNESIUM: CPT

## 2025-04-21 PROCEDURE — 83036 HEMOGLOBIN GLYCOSYLATED A1C: CPT

## 2025-04-21 PROCEDURE — 96374 THER/PROPH/DIAG INJ IV PUSH: CPT

## 2025-04-21 PROCEDURE — 84466 ASSAY OF TRANSFERRIN: CPT | Performed by: STUDENT IN AN ORGANIZED HEALTH CARE EDUCATION/TRAINING PROGRAM

## 2025-04-21 PROCEDURE — 99285 EMERGENCY DEPT VISIT HI MDM: CPT | Mod: 25 | Performed by: STUDENT IN AN ORGANIZED HEALTH CARE EDUCATION/TRAINING PROGRAM

## 2025-04-21 PROCEDURE — 85730 THROMBOPLASTIN TIME PARTIAL: CPT

## 2025-04-21 PROCEDURE — 1200000002 HC GENERAL ROOM WITH TELEMETRY DAILY

## 2025-04-21 PROCEDURE — 93005 ELECTROCARDIOGRAM TRACING: CPT

## 2025-04-21 PROCEDURE — 74176 CT ABD & PELVIS W/O CONTRAST: CPT | Performed by: RADIOLOGY

## 2025-04-21 PROCEDURE — 82746 ASSAY OF FOLIC ACID SERUM: CPT | Performed by: STUDENT IN AN ORGANIZED HEALTH CARE EDUCATION/TRAINING PROGRAM

## 2025-04-21 PROCEDURE — 82947 ASSAY GLUCOSE BLOOD QUANT: CPT

## 2025-04-21 RX ORDER — PANTOPRAZOLE SODIUM 20 MG/1
20 TABLET, DELAYED RELEASE ORAL DAILY
Qty: 30 TABLET | Refills: 0 | Status: SHIPPED | OUTPATIENT
Start: 2025-04-21 | End: 2025-04-22 | Stop reason: HOSPADM

## 2025-04-21 RX ORDER — ONDANSETRON HYDROCHLORIDE 2 MG/ML
4 INJECTION, SOLUTION INTRAVENOUS ONCE
Status: COMPLETED | OUTPATIENT
Start: 2025-04-21 | End: 2025-04-21

## 2025-04-21 RX ORDER — CARVEDILOL 6.25 MG/1
6.25 TABLET ORAL
Status: DISCONTINUED | OUTPATIENT
Start: 2025-04-21 | End: 2025-04-22 | Stop reason: HOSPADM

## 2025-04-21 RX ORDER — PANTOPRAZOLE SODIUM 40 MG/1
40 TABLET, DELAYED RELEASE ORAL
Status: DISCONTINUED | OUTPATIENT
Start: 2025-04-22 | End: 2025-04-22 | Stop reason: HOSPADM

## 2025-04-21 RX ORDER — POLYETHYLENE GLYCOL 3350 17 G/17G
238 POWDER, FOR SOLUTION ORAL ONCE AS NEEDED
Status: DISCONTINUED | OUTPATIENT
Start: 2025-04-21 | End: 2025-04-22 | Stop reason: HOSPADM

## 2025-04-21 RX ORDER — POLYETHYLENE GLYCOL 3350, SODIUM CHLORIDE, SODIUM BICARBONATE, POTASSIUM CHLORIDE 420; 11.2; 5.72; 1.48 G/4L; G/4L; G/4L; G/4L
4000 POWDER, FOR SOLUTION ORAL ONCE
Status: DISCONTINUED | OUTPATIENT
Start: 2025-04-21 | End: 2025-04-22 | Stop reason: HOSPADM

## 2025-04-21 RX ORDER — INSULIN GLARGINE 100 [IU]/ML
11 INJECTION, SOLUTION SUBCUTANEOUS NIGHTLY
Status: DISCONTINUED | OUTPATIENT
Start: 2025-04-21 | End: 2025-04-22 | Stop reason: HOSPADM

## 2025-04-21 RX ORDER — POLYETHYLENE GLYCOL 3350, SODIUM CHLORIDE, SODIUM BICARBONATE, POTASSIUM CHLORIDE 420; 11.2; 5.72; 1.48 G/4L; G/4L; G/4L; G/4L
2000 POWDER, FOR SOLUTION ORAL ONCE AS NEEDED
Status: DISCONTINUED | OUTPATIENT
Start: 2025-04-21 | End: 2025-04-22 | Stop reason: HOSPADM

## 2025-04-21 RX ORDER — PANTOPRAZOLE SODIUM 40 MG/10ML
40 INJECTION, POWDER, LYOPHILIZED, FOR SOLUTION INTRAVENOUS ONCE
Status: DISCONTINUED | OUTPATIENT
Start: 2025-04-21 | End: 2025-04-21

## 2025-04-21 RX ORDER — NAPROXEN SODIUM 220 MG/1
81 TABLET, FILM COATED ORAL DAILY
Status: DISCONTINUED | OUTPATIENT
Start: 2025-04-21 | End: 2025-04-22 | Stop reason: HOSPADM

## 2025-04-21 RX ORDER — ALBUTEROL SULFATE 0.83 MG/ML
2.5 SOLUTION RESPIRATORY (INHALATION) EVERY 8 HOURS PRN
Status: DISCONTINUED | OUTPATIENT
Start: 2025-04-21 | End: 2025-04-22 | Stop reason: HOSPADM

## 2025-04-21 RX ORDER — AMIODARONE HYDROCHLORIDE 200 MG/1
100 TABLET ORAL DAILY
Status: DISCONTINUED | OUTPATIENT
Start: 2025-04-21 | End: 2025-04-21

## 2025-04-21 RX ORDER — DAPAGLIFLOZIN 5 MG/1
5 TABLET, FILM COATED ORAL DAILY
Status: DISCONTINUED | OUTPATIENT
Start: 2025-04-21 | End: 2025-04-22

## 2025-04-21 RX ORDER — ONDANSETRON HYDROCHLORIDE 2 MG/ML
4 INJECTION, SOLUTION INTRAVENOUS EVERY 6 HOURS PRN
Status: DISCONTINUED | OUTPATIENT
Start: 2025-04-21 | End: 2025-04-22 | Stop reason: HOSPADM

## 2025-04-21 RX ORDER — INSULIN LISPRO 100 [IU]/ML
0-10 INJECTION, SOLUTION INTRAVENOUS; SUBCUTANEOUS EVERY 4 HOURS
Status: DISCONTINUED | OUTPATIENT
Start: 2025-04-21 | End: 2025-04-21

## 2025-04-21 RX ORDER — WARFARIN 7.5 MG/1
3.75 TABLET ORAL
Status: DISCONTINUED | OUTPATIENT
Start: 2025-04-22 | End: 2025-04-22 | Stop reason: HOSPADM

## 2025-04-21 RX ORDER — ALUMINUM HYDROXIDE, MAGNESIUM HYDROXIDE, AND SIMETHICONE 1200; 120; 1200 MG/30ML; MG/30ML; MG/30ML
10 SUSPENSION ORAL ONCE
Status: COMPLETED | OUTPATIENT
Start: 2025-04-21 | End: 2025-04-21

## 2025-04-21 RX ORDER — SODIUM CHLORIDE, SODIUM LACTATE, POTASSIUM CHLORIDE, CALCIUM CHLORIDE 600; 310; 30; 20 MG/100ML; MG/100ML; MG/100ML; MG/100ML
100 INJECTION, SOLUTION INTRAVENOUS CONTINUOUS
Status: DISCONTINUED | OUTPATIENT
Start: 2025-04-21 | End: 2025-04-21

## 2025-04-21 RX ORDER — METOCLOPRAMIDE HYDROCHLORIDE 5 MG/ML
5 INJECTION INTRAMUSCULAR; INTRAVENOUS ONCE
Status: COMPLETED | OUTPATIENT
Start: 2025-04-21 | End: 2025-04-21

## 2025-04-21 RX ORDER — FUROSEMIDE 20 MG/1
20 TABLET ORAL
Status: DISCONTINUED | OUTPATIENT
Start: 2025-04-22 | End: 2025-04-22 | Stop reason: HOSPADM

## 2025-04-21 RX ORDER — WARFARIN 7.5 MG/1
7.5 TABLET ORAL
Status: DISCONTINUED | OUTPATIENT
Start: 2025-04-21 | End: 2025-04-22 | Stop reason: HOSPADM

## 2025-04-21 RX ORDER — ATORVASTATIN CALCIUM 40 MG/1
40 TABLET, FILM COATED ORAL DAILY
Status: DISCONTINUED | OUTPATIENT
Start: 2025-04-21 | End: 2025-04-22 | Stop reason: HOSPADM

## 2025-04-21 RX ORDER — AMIODARONE HYDROCHLORIDE 200 MG/1
100 TABLET ORAL DAILY
Status: DISCONTINUED | OUTPATIENT
Start: 2025-04-21 | End: 2025-04-22 | Stop reason: HOSPADM

## 2025-04-21 RX ADMIN — ALUMINUM HYDROXIDE, MAGNESIUM HYDROXIDE, AND SIMETHICONE 10 ML: 200; 200; 20 SUSPENSION ORAL at 08:01

## 2025-04-21 RX ADMIN — METOCLOPRAMIDE 5 MG: 5 INJECTION, SOLUTION INTRAMUSCULAR; INTRAVENOUS at 08:01

## 2025-04-21 RX ADMIN — MOMETASONE FUROATE 1 PUFF: 220 INHALANT RESPIRATORY (INHALATION) at 13:55

## 2025-04-21 RX ADMIN — MOMETASONE FUROATE 1 PUFF: 220 INHALANT RESPIRATORY (INHALATION) at 22:47

## 2025-04-21 RX ADMIN — ATORVASTATIN CALCIUM 40 MG: 40 TABLET, FILM COATED ORAL at 22:46

## 2025-04-21 RX ADMIN — ONDANSETRON 4 MG: 2 INJECTION INTRAMUSCULAR; INTRAVENOUS at 04:35

## 2025-04-21 SDOH — SOCIAL STABILITY: SOCIAL INSECURITY: HAVE YOU HAD THOUGHTS OF HARMING ANYONE ELSE?: NO

## 2025-04-21 SDOH — HEALTH STABILITY: PHYSICAL HEALTH: ON AVERAGE, HOW MANY DAYS PER WEEK DO YOU ENGAGE IN MODERATE TO STRENUOUS EXERCISE (LIKE A BRISK WALK)?: 0 DAYS

## 2025-04-21 SDOH — ECONOMIC STABILITY: FOOD INSECURITY: WITHIN THE PAST 12 MONTHS, THE FOOD YOU BOUGHT JUST DIDN'T LAST AND YOU DIDN'T HAVE MONEY TO GET MORE.: NEVER TRUE

## 2025-04-21 SDOH — ECONOMIC STABILITY: FOOD INSECURITY: HOW HARD IS IT FOR YOU TO PAY FOR THE VERY BASICS LIKE FOOD, HOUSING, MEDICAL CARE, AND HEATING?: HARD

## 2025-04-21 SDOH — ECONOMIC STABILITY: HOUSING INSECURITY: AT ANY TIME IN THE PAST 12 MONTHS, WERE YOU HOMELESS OR LIVING IN A SHELTER (INCLUDING NOW)?: NO

## 2025-04-21 SDOH — ECONOMIC STABILITY: HOUSING INSECURITY: IN THE LAST 12 MONTHS, WAS THERE A TIME WHEN YOU WERE NOT ABLE TO PAY THE MORTGAGE OR RENT ON TIME?: NO

## 2025-04-21 SDOH — ECONOMIC STABILITY: TRANSPORTATION INSECURITY: IN THE PAST 12 MONTHS, HAS LACK OF TRANSPORTATION KEPT YOU FROM MEDICAL APPOINTMENTS OR FROM GETTING MEDICATIONS?: YES

## 2025-04-21 SDOH — ECONOMIC STABILITY: FOOD INSECURITY: WITHIN THE PAST 12 MONTHS, YOU WORRIED THAT YOUR FOOD WOULD RUN OUT BEFORE YOU GOT THE MONEY TO BUY MORE.: NEVER TRUE

## 2025-04-21 SDOH — SOCIAL STABILITY: SOCIAL INSECURITY: HAVE YOU HAD ANY THOUGHTS OF HARMING ANYONE ELSE?: NO

## 2025-04-21 SDOH — SOCIAL STABILITY: SOCIAL INSECURITY: WITHIN THE LAST YEAR, HAVE YOU BEEN HUMILIATED OR EMOTIONALLY ABUSED IN OTHER WAYS BY YOUR PARTNER OR EX-PARTNER?: NO

## 2025-04-21 SDOH — HEALTH STABILITY: PHYSICAL HEALTH: ON AVERAGE, HOW MANY MINUTES DO YOU ENGAGE IN EXERCISE AT THIS LEVEL?: 0 MIN

## 2025-04-21 SDOH — SOCIAL STABILITY: SOCIAL INSECURITY: WITHIN THE LAST YEAR, HAVE YOU BEEN AFRAID OF YOUR PARTNER OR EX-PARTNER?: NO

## 2025-04-21 SDOH — SOCIAL STABILITY: SOCIAL INSECURITY: ARE YOU OR HAVE YOU BEEN THREATENED OR ABUSED PHYSICALLY, EMOTIONALLY, OR SEXUALLY BY ANYONE?: NO

## 2025-04-21 SDOH — SOCIAL STABILITY: SOCIAL INSECURITY
WITHIN THE LAST YEAR, HAVE YOU BEEN KICKED, HIT, SLAPPED, OR OTHERWISE PHYSICALLY HURT BY YOUR PARTNER OR EX-PARTNER?: NO

## 2025-04-21 SDOH — SOCIAL STABILITY: SOCIAL INSECURITY: ARE THERE ANY APPARENT SIGNS OF INJURIES/BEHAVIORS THAT COULD BE RELATED TO ABUSE/NEGLECT?: NO

## 2025-04-21 SDOH — SOCIAL STABILITY: SOCIAL INSECURITY: DO YOU FEEL UNSAFE GOING BACK TO THE PLACE WHERE YOU ARE LIVING?: NO

## 2025-04-21 SDOH — SOCIAL STABILITY: SOCIAL INSECURITY: ABUSE: ADULT

## 2025-04-21 SDOH — SOCIAL STABILITY: SOCIAL INSECURITY
WITHIN THE LAST YEAR, HAVE YOU BEEN RAPED OR FORCED TO HAVE ANY KIND OF SEXUAL ACTIVITY BY YOUR PARTNER OR EX-PARTNER?: NO

## 2025-04-21 SDOH — ECONOMIC STABILITY: INCOME INSECURITY: IN THE PAST 12 MONTHS HAS THE ELECTRIC, GAS, OIL, OR WATER COMPANY THREATENED TO SHUT OFF SERVICES IN YOUR HOME?: YES

## 2025-04-21 SDOH — SOCIAL STABILITY: SOCIAL INSECURITY: DOES ANYONE TRY TO KEEP YOU FROM HAVING/CONTACTING OTHER FRIENDS OR DOING THINGS OUTSIDE YOUR HOME?: NO

## 2025-04-21 SDOH — SOCIAL STABILITY: SOCIAL INSECURITY: DO YOU FEEL ANYONE HAS EXPLOITED OR TAKEN ADVANTAGE OF YOU FINANCIALLY OR OF YOUR PERSONAL PROPERTY?: NO

## 2025-04-21 SDOH — SOCIAL STABILITY: SOCIAL INSECURITY: WERE YOU ABLE TO COMPLETE ALL THE BEHAVIORAL HEALTH SCREENINGS?: YES

## 2025-04-21 SDOH — SOCIAL STABILITY: SOCIAL INSECURITY: HAS ANYONE EVER THREATENED TO HURT YOUR FAMILY OR YOUR PETS?: NO

## 2025-04-21 ASSESSMENT — ENCOUNTER SYMPTOMS
LIGHT-HEADEDNESS: 0
NAUSEA: 0
CONSTIPATION: 0
FATIGUE: 0
DIZZINESS: 0
CHILLS: 0
ABDOMINAL PAIN: 0
DIARRHEA: 0
HEMATURIA: 0
SHORTNESS OF BREATH: 0
BLOOD IN STOOL: 1
VOMITING: 0

## 2025-04-21 ASSESSMENT — ACTIVITIES OF DAILY LIVING (ADL)
JUDGMENT_ADEQUATE_SAFELY_COMPLETE_DAILY_ACTIVITIES: YES
JUDGMENT_ADEQUATE_SAFELY_COMPLETE_DAILY_ACTIVITIES: YES
ADEQUATE_TO_COMPLETE_ADL: YES
ASSISTIVE_DEVICE: EYEGLASSES;CANE
FEEDING YOURSELF: INDEPENDENT
WALKS IN HOME: INDEPENDENT
DRESSING YOURSELF: INDEPENDENT
DRESSING YOURSELF: INDEPENDENT
HEARING - LEFT EAR: FUNCTIONAL
ADEQUATE_TO_COMPLETE_ADL: YES
HEARING - LEFT EAR: FUNCTIONAL
LACK_OF_TRANSPORTATION: YES
GROOMING: INDEPENDENT
PATIENT'S MEMORY ADEQUATE TO SAFELY COMPLETE DAILY ACTIVITIES?: YES
GROOMING: INDEPENDENT
LACK_OF_TRANSPORTATION: YES
ASSISTIVE_DEVICE: CANE;EYEGLASSES
TOILETING: INDEPENDENT
BATHING: INDEPENDENT
WALKS IN HOME: INDEPENDENT
HEARING - RIGHT EAR: FUNCTIONAL
TOILETING: INDEPENDENT
BATHING: INDEPENDENT
PATIENT'S MEMORY ADEQUATE TO SAFELY COMPLETE DAILY ACTIVITIES?: YES
FEEDING YOURSELF: INDEPENDENT

## 2025-04-21 ASSESSMENT — PAIN SCALES - GENERAL
PAINLEVEL_OUTOF10: 0 - NO PAIN

## 2025-04-21 ASSESSMENT — LIFESTYLE VARIABLES
HOW OFTEN DO YOU HAVE 6 OR MORE DRINKS ON ONE OCCASION: NEVER
HAVE PEOPLE ANNOYED YOU BY CRITICIZING YOUR DRINKING: NO
AUDIT-C TOTAL SCORE: 0
HAVE YOU EVER FELT YOU SHOULD CUT DOWN ON YOUR DRINKING: NO
SUBSTANCE_ABUSE_PAST_12_MONTHS: NO
HOW OFTEN DO YOU HAVE A DRINK CONTAINING ALCOHOL: NEVER
EVER HAD A DRINK FIRST THING IN THE MORNING TO STEADY YOUR NERVES TO GET RID OF A HANGOVER: NO
TOTAL SCORE: 0
PRESCIPTION_ABUSE_PAST_12_MONTHS: NO
SKIP TO QUESTIONS 9-10: 1
AUDIT-C TOTAL SCORE: 0
EVER FELT BAD OR GUILTY ABOUT YOUR DRINKING: NO
HOW MANY STANDARD DRINKS CONTAINING ALCOHOL DO YOU HAVE ON A TYPICAL DAY: PATIENT DOES NOT DRINK

## 2025-04-21 ASSESSMENT — PAIN - FUNCTIONAL ASSESSMENT: PAIN_FUNCTIONAL_ASSESSMENT: 0-10

## 2025-04-21 ASSESSMENT — COLUMBIA-SUICIDE SEVERITY RATING SCALE - C-SSRS
6. HAVE YOU EVER DONE ANYTHING, STARTED TO DO ANYTHING, OR PREPARED TO DO ANYTHING TO END YOUR LIFE?: NO
2. HAVE YOU ACTUALLY HAD ANY THOUGHTS OF KILLING YOURSELF?: NO
1. IN THE PAST MONTH, HAVE YOU WISHED YOU WERE DEAD OR WISHED YOU COULD GO TO SLEEP AND NOT WAKE UP?: NO

## 2025-04-21 ASSESSMENT — PATIENT HEALTH QUESTIONNAIRE - PHQ9
SUM OF ALL RESPONSES TO PHQ9 QUESTIONS 1 & 2: 0
1. LITTLE INTEREST OR PLEASURE IN DOING THINGS: NOT AT ALL
2. FEELING DOWN, DEPRESSED OR HOPELESS: NOT AT ALL

## 2025-04-21 NOTE — DISCHARGE INSTRUCTIONS
Dear Ms. Kenna Ravi,    You came to the hospital due to bloody stools. Your blood count, hemoglobin, remained stable while you were having these episodes which is reassuring. You did not have any more episodes overnight, so you elected to be discharged and consider the endoscopy and colonoscopy as an outpatient.    The GI doctors have put in the referral, so you will be called to schedule the appointment.     You were prescribed an oral iron pill to help with your iron deficiency, please take this for 3 months and then have your levels re-checked with you primary care doctor.    Please continue to take all your medications as prescribed and follow-up with your primary doctor in the next 2 weeks.    Please call your doctor or return to the hospital if you develop new or concerning symptoms.    Thank you,  Your  HealthCare Team

## 2025-04-21 NOTE — PROGRESS NOTES
Pharmacy Medication History Review    Kenna Ravi is a 69 y.o. female admitted for Lower GI bleed. Pharmacy reviewed the patient's [id number] medications and allergies for accuracy.    Medications ADDED:  None  Medications CHANGED:  Warfarin dose change noted on table  Medications REMOVED:   None     The list below reflects the updated PTA list.   Prior to Admission Medications   Prescriptions Last Dose Informant   albuterol 2.5 mg /3 mL (0.083 %) nebulizer solution Past Month Self   Sig: Inhale every 8 hours if needed for wheezing.   amiodarone (Pacerone) 200 mg tablet 4/20/2025 Self   Sig: Take 0.5 tablets (100 mg) by mouth once daily.   aspirin 81 mg chewable tablet 4/20/2025 Self   Sig: Chew 1 tablet (81 mg) once daily.   atorvastatin (Lipitor) 40 mg tablet 4/20/2025 Self   Sig: Take 1 tablet (40 mg) by mouth once daily.   carvedilol (Coreg) 6.25 mg tablet 4/20/2025 Self   Sig: Take 1 tablet (6.25 mg) by mouth 2 times daily (morning and late afternoon).   dapagliflozin propanediol (Farxiga) 5 mg tablet  Self   Sig: Take 1 tablet (5 mg) by mouth once daily.   fluticasone (Flovent HFA) 220 mcg/actuation inhaler Past Month Self   Sig: Inhale 2 puffs 2 times a day.   furosemide (Lasix) 20 mg tablet 4/20/2025 Self   Sig: take one tablet by mouth every morning   furosemide (Lasix) 40 mg tablet  Self   Sig: Take 1 tablet (40 mg) by mouth once daily.  Patient not taking: Reported on 4/21/2025      glipiZIDE XL (Glucotrol XL) 10 mg 24 hr tablet 4/20/2025 Self   Sig: Take 1 tablet by mouth every day   glipiZIDE XL (Glucotrol XL) 5 mg 24 hr tablet Not Taking Self   Sig: Take 1 tablet (5 mg) by mouth once daily.   Patient not taking: Reported on 4/21/2025   insulin glargine (Lantus Solostar U-100 Insulin) 100 unit/mL (3 mL) pen  Self   Sig: inject 22 units daily at bedtime   liraglutide (Victoza 2-Noah) 0.6 mg/0.1 mL (18 mg/3 mL) injection  Self   Sig: Inject 1.8mg subcutaneously daily  Reports takes as PRN does not  "remember last dose on lantus 22u HS     omeprazole (PriLOSEC) 40 mg DR capsule 4/20/2025 Self   Sig: Take 1 capsule (40 mg) by mouth once daily in the morning. Take before meals.   omeprazole (PriLOSEC) 40 mg DR capsule  Self   Sig: TAKE ONE CAPSULE BY MOUTH EVERY DAY IN THE MORNING  Duplicate   sacubitriL-valsartan (Entresto) 49-51 mg tablet     Sig: Take 1 tablet by mouth 2 times a day.  Duplicate   sacubitriL-valsartan (Entresto) 49-51 mg tablet 4/20/2025 Self   Sig: Take 1 tablet by mouth 2 times a day.   warfarin (Coumadin) 7.5 mg tablet 4/20/2025 Self   Sig: take one 1/2 tablet by mouth other than wed & saturday whole pill  Patient taking differently: 7.5 mg on Wednesdays, 3.75 mg all other days   warfarin (Coumadin) 7.5 mg tablet  Self   Sig: TAKE 1/2 TABLET BY MOUTH ONCE DAILY EXCEPT FOR WEDNESDAYS AND SATURDAY TAKE A FULL TABLET  Duplicate      Facility-Administered Medications: None        The list below reflects the updated allergy list. Please review each documented allergy for additional clarification and justification.  Allergies  Reviewed by Irma Handley, RN on 4/21/2025        Severity Reactions Comments    Iodinated Contrast Media High Anaphylaxis, Shortness of breath     Iodine High Anaphylaxis     Penicillins High Anaphylaxis     Enoxaparin Medium Itching, Rash, Swelling Skin breakdown on abdomen at injection sites    Lisinopril Medium Cough             Patient accepts M2B at discharge.     Sources:   [facility]  Pharmacy dispense history  Patient Interview Good historian  Chart Review  Anticoag note 4/2/2025 [facility] referenced     Additional Comments:  Meds reviewed with patient, does not report taking other medications, reports all last home doses taken for maintenance meds as 4/20/2025      Alejandra Santa, PharmD  Transitions of Care Pharmacist  04/21/25     Secure Chat preferred   If no response call [phone] or [employer] \"Med Rec\"    "

## 2025-04-21 NOTE — CONSULTS
[facility]   [facility] [place]  INITIAL CONSULT NOTE     Source of Information: The source of the history was patient    Consult requested by: Service: Hospitalist team    Reason for Consult: BRBPR    Admission Chief Complaint: BRBPR      SUBJECTIVE     HPI: Kenna Ravi is a 69 y.o. female w/PMH of antiphospholipid syndrome [on warfarin, goal INR 2-3] c/b DVT/PE s/p IVC filter, CAD s/p PCI (2019), CKD, HFrEF (LVEF 40-45% 04/07/2025), mod-to-severe MR, NVST/VT, PALAK, and T2DM p/w bright red blood per rectum. GI was consulted for evaluation.    Patient states she felt the need to have a BM last night, but only bright red blood came out, prompting her to come to the ED. Reports 2 additional episodes since being here, has not passed any stool. States she otherwise feels well, no n/v, abdominal/rectal pain, or other acute concerns. Denies any prior similar episodes, melena, hematochezia, or known history of GI bleed. Endorses some imbalance upon standing earlier in the ED, but states this frequently happens to her after laying down; denies dizziness, lightheadedness, fatigue, shortness of breath, or palpitations. Negative Cologaurd ~1-year-ago, has never had an EGD or colonoscopy. No FH of colorectal cancer.      ROS: Complete review of systems obtained, negative unless otherwise indicated above.     Allergies[1]  Medical History[2]  Surgical History[3]  Family History[4]  Social History     Social History Narrative    Not on file      reports that she has quit smoking. Her smoking use included cigarettes. She has a 5 pack-year smoking history. She has never used smokeless tobacco. She reports that she does not drink alcohol and does not use drugs.    Medications:  Scheduled medications  Scheduled Medications[5]  Continuous medications  Continuous Medications[6]  PRN medications  PRN Medications[7]       EXAM     Vital signs:  Visit Vitals  /69   Pulse 76   Temp 36 °C  (96.8 °F) (Oral)   Resp 18       Physical Exam  Vitals reviewed.   Constitutional:       General: She is not in acute distress.     Appearance: She is not toxic-appearing or diaphoretic.   HENT:      Head: Normocephalic.      Mouth/Throat:      Mouth: Mucous membranes are moist.   Eyes:      Conjunctiva/sclera: Conjunctivae normal.   Cardiovascular:      Rate and Rhythm: Normal rate.   Pulmonary:      Effort: Pulmonary effort is normal. No respiratory distress (On RA).   Abdominal:      Palpations: Abdomen is soft.      Tenderness: There is no abdominal tenderness.   Skin:     General: Skin is warm and dry.   Neurological:      General: No focal deficit present.      Mental Status: She is alert and oriented to person, place, and time.   Psychiatric:         Behavior: Behavior normal.         Thought Content: Thought content normal.             DATA                                                                            Labs     Lab Results   Component Value Date    WBC 6.4 04/21/2025    WBC 6.3 06/03/2024    WBC 5.8 06/02/2024    HGB 11.6 (L) 04/21/2025    HGB 10.7 (L) 06/03/2024    HGB 11.7 (L) 06/02/2024    MCV 87 04/21/2025    MCV 93 06/03/2024    MCV 89 06/02/2024     04/21/2025     06/03/2024     06/02/2024       Lab Results   Component Value Date    GLUCOSE 137 (H) 04/21/2025    CALCIUM 7.9 (L) 04/21/2025     04/21/2025    K 4.2 04/21/2025    CO2 28 04/21/2025     04/21/2025    BUN 12 04/21/2025    CREATININE 1.51 (H) 04/21/2025       Lab Results   Component Value Date    ALT 5 (L) 04/21/2025    ALT 7 06/02/2024    ALT 7 12/20/2023    AST 15 04/21/2025    AST 17 06/02/2024    AST 11 12/20/2023    ALKPHOS 60 04/21/2025    ALKPHOS 74 06/02/2024    ALKPHOS 72 12/20/2023    BILITOT 0.4 04/21/2025    BILITOT 0.3 06/02/2024    BILITOT 0.4 12/20/2023                                                                                  Imaging             === 04/21/25 ===    CT ABDOMEN  PELVIS WO IV CONTRAST    - Impression -  1.  No acute process within the abdomen/pelvis.  2. Ventral transverse colon containing abdominal wall hernia with  relatively narrow neck, but no evidence of obstruction/strangulation.  3. Other chronic findings as above.    I personally reviewed the images/study and I agree with the findings  as stated by Dr. Lanre Conway. This study was interpreted at  [facility], [place], [place].    MACRO:  None    Signed by: Lopez Johnson 4/21/2025 5:34 AM  Dictation workstation:   [id number]                                                                           GI Procedures   None    ASSESSMENT / PLAN                  Assessment and Recommendations:   Ms. Ravi is a 69-year-old female with a history of antiphospholipid syndrome [on warfarin, goal INR 2-3] c/b DVT/PE s/p IVC filter, CAD s/p PCI (2019), CKD, HFrEF (LVEF 40-45% 04/07/2025), mod-to-severe MR, NVST/VT, PALAK, and T2DM who presents with acute BRBPR. Hemodynamically stable with hgb at baseline (11.6), labs notable for iron deficiency anemia (ferritin 22/iron 47/TIBC 259/%sat 18). Positive orthostatics likely erroneous given patient is asymptomatic and labs/hemodynamics do not indicate a significant a significant volume of blood loss.    Ddx includes diverticular bleed, AVMs, ulceration, ischemic colitis, infectious colitis, malignancy, hemorrhoids, Dieulafoy lesion/brisk UGIB, etc.    For acute, hemodynamically significant, active LGIB, etiology is more likely vascular such as diverticular bleed, aortoenteric fistula, or rapid UGIB (note that 10-15% of rapid UGIB can present as hematochezia with hemodynamic instability). Less likely are entities such as ischemic, inflammatory, AVMs, or colonic mass, as these typically do not present with massive bleeding.    Recommendations:  - Plan for EGD and colonoscopy, anticipate multi-day bowel prep given her h/o nausea and vomiting with prep  - Clear  liquid diet for the rest of today  - NPO at MN except for meds and bowel prep  - Please start 4L Golytely this evening  - Antiemetics per primary team  - Maintain 2 large bore peripheral IVs (18 gauge), or central venous access as indicated  - IVF boluses to maintain hemodynamic stability  - Trend H/H q8h or as indicated by clinical status  - Trend INR daily, anticoagulation per primary team  - Maintain active type & screen.  Transfuse for goal Hgb 7 or if patient deteriorates or develops hemodynamic instability.  - Please notify GI team with any acute clinical changes       QUINCY per primary team.   ------------------------------------------------------------------------  Ashlyn Baron MD   Gastroenterology PGY1    The above recommendations were communicated to the primary team.    Patient was discussed with Dr. Reddy.    Thank you for the consultation. GI will continue to follow.  During weekday hours of 7am-5pm, please do not hesitate to contact me on [employer] Chat  or page [phone], if there are any further questions.  After hours, on weekends, and on holidays, please page the on-call GI fellow at [phone].  Thank you         [1]   Allergies  Allergen Reactions    Iodinated Contrast Media Anaphylaxis and Shortness of breath    Iodine Anaphylaxis    Penicillins Anaphylaxis    Enoxaparin Itching, Rash and Swelling     Skin breakdown on abdomen at injection sites    Lisinopril Cough   [2]   Past Medical History:  Diagnosis Date    Other conditions influencing health status     Acute Myocardial Infarction   [3]   Past Surgical History:  Procedure Laterality Date    OTHER SURGICAL HISTORY  09/26/2018    Implantable Cardioverter-Defibrillator    TONSILLECTOMY  09/15/2013    Tonsillectomy   [4] No family history on file.  [5] [Held by provider] amiodarone, 100 mg, oral, Daily  aspirin, 81 mg, oral, Daily  atorvastatin, 40 mg, oral, Daily  [Held by provider] carvedilol, 6.25 mg, oral, BID  [Held by provider] dapagliflozin propanediol,  5 mg, oral, Daily  [Held by provider] furosemide, 20 mg, oral, Daily before breakfast  insulin glargine, 11 Units, subcutaneous, Nightly  mometasone, 1 puff, inhalation, BID  [START ON 4/22/2025] pantoprazole, 40 mg, oral, Daily before breakfast  [Held by provider] sacubitriL-valsartan, 1 tablet, oral, BID  [Held by provider] warfarin, 3.75 mg, oral, Once per day on Sunday Tuesday Wednesday Thursday Friday Saturday  [Held by provider] warfarin, 7.5 mg, oral, Every Monday  [6]    [7] PRN medications: albuterol

## 2025-04-21 NOTE — H&P
History Of Present Illness  69 y.o F w/PMHx HFrEF (EF 40-45 3/2025), non-obstructive CAD s/p PCI to LCx (, cath in 2023), NSVT and VT s/p CRT (), anti-phospholipid ab syndrome c/b DVT/PE s/p [place] filter (on coumadin), hypothyroidism, PALAK (on CPAP), CKD (b/l Cr 1.3-1.5), COPD, T2DM (A1C 7.5 2024) who presented to ED after 1 episode of large volume BRBPR.      Reports she woke up in the middle of the night with the urge to defecate. She was able to ambulate to the toilet without feeling lightheaded or dizzy. She noted only bright red blood in the toilet bowl without BM. Has never happened before. Denies preceding abdominal pain or cramps. Has been on coumadin for many years. Has never had a prior colonoscopy - says she attempted preps in the past but would always get nauseous. She reportedly completed a FIT test this year that was negative.     Denies current lightheadedness, dizziness, CP, SOB, abdominal pain. ROS +unintentional weight loss - cannot quantify but says a few lbs 'here and there.'     Given Hb at baseline, patient was supposed to be discharged until she stood up, got dizzy and was found to have + orthostats. She was found to be saturating 86% requiring NC. She was then recommended admission.    ED COURSE:  - Vital Signs: T 35.8, HR 67-80, RR 14-18, -125/62-71, O2 96   - Labs:  CBC: WBC 6.4, Hb 11.6 (baseline 10-11), MCV 87, Plt 195  CHEM: Na 138, K 4.2, Cl 105, Bicarb 28, BUN 12, Cr 1.51 (baseline 1.3-1.5)  LFTs: Alk phos 60, ALT 5, AST 15, Tbili 0.4  [id number]  COAGS: INR 2.2  - EKG: A sensed, V paced  ms     - Imaging:    CTAP w/o IV contrast   IMPRESSION:  1.  No acute process within the abdomen/pelvis.  2. Ventral transverse colon containing abdominal wall hernia with  relatively narrow neck, but no evidence of obstruction/strangulation.  3. Other chronic findings as above.     Past Medical History  She has a past medical history of Other conditions  influencing health status.    Surgical History  She has a past surgical history that includes Tonsillectomy (09/15/2013) and Other surgical history (09/26/2018).     Social History  She reports that she has quit smoking. Her smoking use included cigarettes. She has a 5 pack-year smoking history. She has never used smokeless tobacco. She reports that she does not drink alcohol and does not use drugs.    Family History  Family History[1]     Allergies  Iodinated contrast media, Iodine, Penicillins, Enoxaparin, and Lisinopril    Review of Systems   Constitutional:  Negative for chills and fatigue.   Respiratory:  Negative for shortness of breath.    Cardiovascular:  Negative for chest pain.   Gastrointestinal:  Positive for blood in stool. Negative for abdominal pain, constipation, diarrhea, nausea and vomiting.   Genitourinary:  Negative for hematuria.   Neurological:  Negative for dizziness and light-headedness.     PHYSICAL EXAM:  General: resting in bed in NAD  HEENT: MMM  Chest: ctab, initially on 3L NC -> weaned to RA during exam  Cardiac: RRR  Abdomen: soft, mild tenderness to palpation over known hernia, no TTP elsewhere  EXT: 1+ edema bilaterally  Neuro: AOx3     Last Recorded Vitals  /69   Pulse 76   Temp 36 °C (96.8 °F) (Oral)   Resp 18   Wt 90.7 kg (200 lb)   SpO2 95%        Assessment/Plan   Assessment & Plan  Lower GI bleed    ASSESSMENT: 69 y.o F w/PMHx HFrEF (EF 40-45 3/2025), non-obstructive CAD s/p PCI to LCx (2019, cath in 8/2023), NSVT and VT s/p CRT (2019), anti-phospholipid ab syndrome c/b DVT/PE s/p [place] filter (on coumadin), hypothyroidism, PALAK (on CPAP), CKD (b/l Cr 1.3-1.5), COPD, T2DM (A1C 7.5 6/2024) who presented to ED after 1 episode of large volume BRBPR. HDS, not hypotensive but BP soft. Labs notable for Hb @ baseline. Patient needs C-scope for evaluation, however if Hb remains stable, and asymptomatic, can pursue as OP. Notably orthostatic with SBP drop 130 -> 106,  doesn't appear dry on exam so will not give fluids, but will hold GDMT, perhaps needs down titration of meds.    Addendum: had additional episode of BRBPR - started CLD, GI consulted. Held amio.    PLAN:    #BRBPR x1 episode @ home  #Normocytic anemia  #Low % iron sat  ::LGIB ddx broad including diverticular vs malignancy vs ?coumadin related (however would need underlying lesion forthis) vs AVM given CKD  ::Hb 11.6 on admission, baseline 10-11  ::CTAP w/o acute bleed  ::never had prior C-scope, notes issues with prep  ::on coumadin  -monitor for further episodes of bloody stools, none thus far  -daily CBC to monitor Hb, goal Hb >7  -active T&S  -anemia MICHEL: iron studies notable for % sat 18 -> can discharge on PO iron supplementation   -fu folate and B12  -GI consulted given additional episode of BRBPR while in ER, CLD for now, NPO @ [place], awaiting recs    #HFrEF  #MR  #CAD s/p PCI to LCx (2019)  ::recently saw cardiology 4/15, increased coreg to 6.25 BID due to elevated BP  ::home GDMT: Entresto 49-51 BID, coreg 6.25 BID, dapa 5 mg daily (not on MRA due to prior hyperK), lasix  ::recent cath 8/2023 w/non-obstructive CAD  -c/w home ASA 81 and statin 40 mg  -hold GDMT iso +orthostats and soft BP, likely needs to reduce coreg dosing  -repeat orthos tomorrow AM  -defer fluids for now as pt with LE edema on exam    #Prior VT and VFib s/p ICD  -hold home amio 100     #APAS c/b DVT/PE s/p IVC filter  ::on home coumadin  ::therapeutic INR on admission 2.2  -hold home coumadin for now  -therapeutic, will not bridge w/heparin for now given bleeding    #T2DM  -fu repeat A1C  -hold home glipizide 10 mg  -on home lantus 22 units, will restart @ 11 units   -SSI    #Hypothryoid  -not on thyroid replacement  -recheck TSH/T4    #PALAK (on CPAP)  #COPD  -respiratory acidosis on admission  -c/w home inhalers  -RT consult for CPAP    #CKD  -Cr at baseline, 1.3-1.5  -ctm    F: caution given vol overload  E: prn  N: NPO @ MN   A: pIV  DVT  PPx: SCD, hold pharmacologic ppx  GI PPx: home PPI     CODE STATUS: FULL (confirmed on admission)   SURROGATE DECISION MAKER: Meaghan (sister) [phone]         Steven Conway MD         [1] No family history on file.

## 2025-04-21 NOTE — ED TRIAGE NOTES
PT to the ED for rectal bleeding onset an hour ago. Pt states that she started feeling nauseous and had bright red blood BM. On high dose coumadin, levels checked 2 weeks ago and reports that she is within her therapeutic range. Denies any dizziness but endorsing SOB.

## 2025-04-21 NOTE — ED PROVIDER NOTES
History of Present Illness     History provided by: Patient  Limitations to History: None  External Records Reviewed with Brief Summary:  previous ED and hospital     HPI:  Kenna Ravi is a 69 y.o. female with a PMH of non obstructive CAD ( cath in Aug 2023), CKD (baseline sCr 1.3-1.5), COPD, T2DM, remote MI, vtach s/p AICD, HFrEF (EF 35-40% 2023), and remote DVT (on Coumadin) who presents for evaluation of 1 episode of large-volume bright red blood per rectum.  Patient went to the bathroom having abdominal cramping around 2 AM this morning.  She thought that she had had a large volume diarrhea but when she was on the toilet without blood.  She did not pass out.  Her last INR was within range.    Physical Exam   Triage vitals:  T 35.8 °C (96.4 °F)  HR 67  /71  RR 18  O2 97 % None (Room air)    General: Awake, alert, in no acute distress  Eyes: Gaze conjugate.  No scleral icterus or injection  HENT: Normo-cephalic, atraumatic. No stridor  CV: Regular rate, regular rhythm. Radial pulses 2+ bilaterally  Resp: Breathing non-labored, speaking in full sentences.  Clear to auscultation bilaterally  GI: Soft, non-distended, non-tender. No rebound or guarding.  : LELE with out melena or ale blood, small hemorrhoid noted. No fissure. No fluctuance.   MSK/Extremities: No gross bony deformities. Moving all extremities  Skin: Warm. Appropriate color  Neuro: Alert. Oriented. Face symmetric. Speech is fluent.  Gross strength and sensation intact in b/l UE and LEs  Psych: Appropriate mood and affect      Medical Decision Making & ED Course   Medical Decision Makin y.o. female with who presents for evaluation of bloody bowel movement at home.  Patient is on blood thinners.  Her INR has been within normal limits on previous checks.  Basic labs obtained for the patient including coags.  LELE did not show melena or bright red blood per rectum.  Patient does have elevated creatinine that at her baseline.  Her  hemoglobin was not significantly deranged from previous checks.  Patient had no additional episodes of bloody bowel movements while she was in the ED.  She is having some mild nausea that was treated with multimodal antiemetics.  She received a CT in the setting of having some mild ongoing nausea and abdominal pain.  I do not have concern for active bleeding so CT angio was not obtained.  Patient is pending p.o. challenge for disposition.  ----   Social Determinants of Health which Significantly Impact Care: None identified The following actions were taken to address these social determinants: Patient given referral to GI    EKG Independent Interpretation: EKG interpreted by myself. Please see ED Course and MDM for full interpretation.    Independent Result Review and Interpretation: Results were independently reviewed and interpreted by myself. Please see ED course and MDM for full interpretation.    Chronic conditions affecting the patient's care: As documented in the MDM    The patient was discussed with the following consultants/services: None    Care Considerations: As per [facility]    ED Course:  ED Course as of 04/25/25 1825   Mon Apr 21, 2025   0646 Blood Gas Venous Full Panel(!)  Mild respiratory acidosis without metabolic compensation [SC]   0646 Comprehensive metabolic panel(!)  Creatinine at baseline no significant electrolyte or hepatic derangement [SC]   0647 CBC and Auto Differential(!)  No anemia from baseline no leukocytosis or thrombocytopenia [SC]   0647 Coagulation Screen(!)  INR within range at 2.2 [SC]   0647 CT abdomen pelvis wo IV contrast  Ventral hernia containing bowel loops without strangulation or incarceration no signs of enteritis, patient has a kidney cyst but otherwise no acute findings. [SC]   1011 On orthostatic vital signs, blood pressure went from 130 systolic down to 106.  Patient does appear mildly dehydrated today.  Started on IV fluids. [AS]      ED Course User Index  [AS] Inocente  MD Aman  [SC] Vy Iverson DO         Diagnoses as of 25 1825   Lower GI bleed     Disposition   Patient was signed out pending reevaluation and p.o. challenge.  Anticipate home-going  Procedures   Procedures    Patient seen and discussed with ED attending physician.    Vy Iverson DO  Emergency Medicine    ---------------------------------------------------------    ATTENDING NOTE for Chip Bang MD:    ATTENDING ATTESTATION:  The patient was seen by the resident/fellow.  I have personally performed a substantive portion of the encounter.  I have seen and examined the patient; agree with the workup, evaluation, MDM, management and diagnosis.  The care plan has been discussed with the resident/fellow; I have reviewed the resident/fellow´s note and agree with the documented findings with the exception/addition of the followin-year-old with history of CAD, CKD, COPD, diabetes, AICD and remote DVT on Coumadin who presents with 1 episode of bright red blood per rectum.  She is never had GI bleeding before.  Denies any history of black stool.  She reports no history of hemorrhoids and reports her INR has been therapeutic.  Patient looks well overall with a benign abdominal exam.  She reports no previous colonoscopies which raise suspicion for potential GI malignancy which is causing her bleeding.  It also could be hemorrhoids.  Hemoglobin is similar to baseline which points away from significant bleeding especially given reassuring vital signs.  During the first few hours of the patient was here, she did not have any more bowel movements which points away from active bleeding.  If her CT is reassuring and her vitals remain stable, I believe the patient could be discharged with urgent referral to primary care as well as GI for colonoscopy.  Return precautions were discussed with the patient.    ---------------------------------------------------------     Vy Iverson  DO  Resident  04/25/25 7067

## 2025-04-21 NOTE — ASSESSMENT & PLAN NOTE
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 HDS, not hypotensive but BP soft. Labs notable for Hb @ baseline. Patient needs C-scope for evaluation, however if Hb remains stable, and asymptomatic, can pursue as OP. Notably orthostatic with SBP drop 130 -> 106, doesn't appear dry on exam so will not give fluids, but will hold GDMT, perhaps needs down titration of meds.    Addendum: had additional episode of BRBPR - started CLD, GI consulted. Held kaley.    PLAN:    #BRBPR x1 episode @ home  #Normocytic anemia  #Low % iron sat  ::LGIB ddx broad including diverticular vs malignancy vs ?coumadin related (however would need underlying lesion forthis) vs AVM given CKD  ::Hb 11.6 on admission, baseline 10-11  ::CTAP w/o acute bleed  ::never had prior C-scope, notes issues with prep  ::on coumadin  -monitor for further episodes of bloody stools, none thus far  -daily CBC to monitor Hb, goal Hb >7  -active T&S  -anemia MICHEL: iron studies notable for % sat 18 -> can discharge on PO iron supplementation   -fu folate and B12  -GI consulted given additional episode of BRBPR while in ER, CLD for now, NPO @ MN, awaiting recs    #HFrEF  #MR  #CAD s/p PCI to LCx (2019)  ::recently saw cardiology 4/15, increased coreg to 6.25 BID due to elevated BP  ::home GDMT: Entresto 49-51 BID, coreg 6.25 BID, dapa 5 mg daily (not on MRA due to prior hyperK), lasix  ::recent cath 8/2023 w/non-obstructive CAD  -c/w home ASA 81 and statin 40 mg  -hold GDMT iso +orthostats and soft BP, likely needs to reduce coreg dosing  -repeat orthos tomorrow AM  -defer fluids for now as pt with LE edema on exam    #Prior VT and VFib s/p ICD  -hold home  amio 100     #APAS c/b DVT/PE s/p IVC filter  ::on home coumadin  ::therapeutic INR on admission 2.2  -hold home coumadin for now  -therapeutic, will not bridge w/heparin for now given bleeding    #T2DM  -fu repeat A1C  -hold home glipizide 10 mg  -on home lantus 22 units, will restart @ 11 units   -SSI    #Hypothryoid  -not on thyroid replacement  -recheck TSH/T4    #PALAK (on CPAP)  #COPD  -respiratory acidosis on admission  -c/w home inhalers  -RT consult for CPAP    #CKD  -Cr at baseline, 1.3-1.5  -ctm    F: caution given vol overload  E: prn  N: NPO @ MN   A: pIV  DVT PPx: SCD, hold pharmacologic ppx  GI PPx: home PPI     CODE STATUS: FULL (confirmed on admission)   SURROGATE DECISION MAKER: Meaghan (sister) [phone]

## 2025-04-21 NOTE — PROGRESS NOTES
Emergency Medicine Transition of Care Note.    I received Kenna Ravi in signout from previous provider. Please see the previous ED provider note for all HPI, PE and MDM up to the time of sign-out. This is in addition to the primary record.    ED Course as of 04/21/25 0911   Mon Apr 21, 2025   0646 Blood Gas Venous Full Panel(!)  Mild respiratory acidosis without metabolic compensation [SC]   0646 Comprehensive metabolic panel(!)  Creatinine at baseline no significant electrolyte or hepatic derangement [SC]   0647 CBC and Auto Differential(!)  No anemia from baseline no leukocytosis or thrombocytopenia [SC]   0647 Coagulation Screen(!)  INR within range at 2.2 [SC]   0647 CT abdomen pelvis wo IV contrast  Ventral hernia containing bowel loops without strangulation or incarceration no signs of enteritis, patient has a kidney cyst but otherwise no acute findings. [SC]      ED Course User Index  [SC] Vy Iverson, DO         Diagnoses as of 04/21/25 0911   Lower GI bleed     Medical Decision Making    Final diagnoses:   None     At the time of sign out, vital signs were as follows:  Vitals:    04/21/25 0811   BP: 125/69   Pulse: 71   Resp: 16   Temp: 37 °C (98.6 °F)   SpO2: 100%     In brief Kenna Ravi is an 69 y.o. female with history of DVT on Coumadin, presenting to the emergency department today for lower GI bleed.  Reportedly had 1 episode of large-volume bright red blood per rectum last evening.    Patient was signed out to me with p.o. challenge and reassessment pending.  On signout, vital signs within normal limits, afebrile for us.  Labs performed in the Emergency Department showed a INR appropriately elevated at 2.2.  Hemoglobin stable at 11.6, lower concern for major acute blood loss anemia.  Chemistries notable for creatinine of 1.51 consistent with patient's known CKD at baseline.  Lactate notably within normal limits.  CT of the abdomen pelvis was performed without contrast that showed no acute process  with a ventral hernia. Patient cannot receive contrast due to an anaphylactic iodine allergy.  Since being in the emergency department, has not had any additional bloody bowel movements.  Lower concern for major arterial bleed.  After Zofran, Reglan, and BMX in the ED patient feels symptomatically improved.  Tolerating p.o. for us.  Patient's open score is 13 today putting her in a moderate risk category (80% chance of safe discharge with recommendations for inpatient admission.) patient attempted to get up and walk to the door and suddenly felt lightheaded, dizzy, and had to sit back down with an oxygen saturation of 86%. Likely orthostatic syncope that could be secondary to blood loss. Placed on 2 L nasal cannula.  Given her risk factors and age, I think she benefit from observation overnight and GI evaluation.    Dispo: Admit    Inocente Newton MD  Emergency Medicine PGY3

## 2025-04-22 ENCOUNTER — ANESTHESIA EVENT (OUTPATIENT)
Dept: GASTROENTEROLOGY | Facility: HOSPITAL | Age: 70
End: 2025-04-22

## 2025-04-22 ENCOUNTER — APPOINTMENT (OUTPATIENT)
Dept: GASTROENTEROLOGY | Facility: HOSPITAL | Age: 70
End: 2025-04-22
Payer: MEDICARE

## 2025-04-22 ENCOUNTER — PHARMACY VISIT (OUTPATIENT)
Dept: PHARMACY | Facility: CLINIC | Age: 70
End: 2025-04-22
Payer: COMMERCIAL

## 2025-04-22 VITALS
SYSTOLIC BLOOD PRESSURE: 103 MMHG | TEMPERATURE: 98.2 F | WEIGHT: 200 LBS | HEIGHT: 64 IN | BODY MASS INDEX: 34.15 KG/M2 | DIASTOLIC BLOOD PRESSURE: 56 MMHG | OXYGEN SATURATION: 93 % | RESPIRATION RATE: 18 BRPM | HEART RATE: 88 BPM

## 2025-04-22 DIAGNOSIS — D50.9 IRON DEFICIENCY ANEMIA, UNSPECIFIED IRON DEFICIENCY ANEMIA TYPE: Primary | ICD-10-CM

## 2025-04-22 DIAGNOSIS — K92.1 HEMATOCHEZIA: ICD-10-CM

## 2025-04-22 PROBLEM — K62.5 RECTAL BLEEDING: Status: ACTIVE | Noted: 2025-04-22

## 2025-04-22 PROBLEM — K92.2 LOWER GI BLEED: Status: RESOLVED | Noted: 2025-04-21 | Resolved: 2025-04-22

## 2025-04-22 LAB
ALBUMIN SERPL BCP-MCNC: 3.2 G/DL (ref 3.4–5)
ANION GAP SERPL CALC-SCNC: 11 MMOL/L (ref 10–20)
APTT PPP: 38 SECONDS (ref 26–36)
BASOPHILS # BLD AUTO: 0.04 X10*3/UL (ref 0–0.1)
BASOPHILS NFR BLD AUTO: 0.6 %
BUN SERPL-MCNC: 18 MG/DL (ref 6–23)
CALCIUM SERPL-MCNC: 7.7 MG/DL (ref 8.6–10.6)
CHLORIDE SERPL-SCNC: 105 MMOL/L (ref 98–107)
CO2 SERPL-SCNC: 27 MMOL/L (ref 21–32)
CREAT SERPL-MCNC: 1.47 MG/DL (ref 0.5–1.05)
EGFRCR SERPLBLD CKD-EPI 2021: 38 ML/MIN/1.73M*2
EOSINOPHIL # BLD AUTO: 0.17 X10*3/UL (ref 0–0.7)
EOSINOPHIL NFR BLD AUTO: 2.7 %
ERYTHROCYTE [DISTWIDTH] IN BLOOD BY AUTOMATED COUNT: 15.5 % (ref 11.5–14.5)
GLUCOSE SERPL-MCNC: 68 MG/DL (ref 74–99)
HCT VFR BLD AUTO: 34.9 % (ref 36–46)
HGB BLD-MCNC: 10.4 G/DL (ref 12–16)
IMM GRANULOCYTES # BLD AUTO: 0.01 X10*3/UL (ref 0–0.7)
IMM GRANULOCYTES NFR BLD AUTO: 0.2 % (ref 0–0.9)
INR PPP: 1.9 (ref 0.9–1.1)
LYMPHOCYTES # BLD AUTO: 2.55 X10*3/UL (ref 1.2–4.8)
LYMPHOCYTES NFR BLD AUTO: 39.8 %
MAGNESIUM SERPL-MCNC: 2.23 MG/DL (ref 1.6–2.4)
MCH RBC QN AUTO: 27.2 PG (ref 26–34)
MCHC RBC AUTO-ENTMCNC: 29.8 G/DL (ref 32–36)
MCV RBC AUTO: 91 FL (ref 80–100)
MONOCYTES # BLD AUTO: 0.52 X10*3/UL (ref 0.1–1)
MONOCYTES NFR BLD AUTO: 8.1 %
NEUTROPHILS # BLD AUTO: 3.12 X10*3/UL (ref 1.2–7.7)
NEUTROPHILS NFR BLD AUTO: 48.6 %
NRBC BLD-RTO: 0 /100 WBCS (ref 0–0)
PHOSPHATE SERPL-MCNC: 2.8 MG/DL (ref 2.5–4.9)
PLATELET # BLD AUTO: 200 X10*3/UL (ref 150–450)
POTASSIUM SERPL-SCNC: 4.2 MMOL/L (ref 3.5–5.3)
PROTHROMBIN TIME: 21.1 SECONDS (ref 9.8–12.4)
RBC # BLD AUTO: 3.83 X10*6/UL (ref 4–5.2)
SODIUM SERPL-SCNC: 139 MMOL/L (ref 136–145)
WBC # BLD AUTO: 6.4 X10*3/UL (ref 4.4–11.3)

## 2025-04-22 PROCEDURE — 99236 HOSP IP/OBS SAME DATE HI 85: CPT

## 2025-04-22 PROCEDURE — 80069 RENAL FUNCTION PANEL: CPT

## 2025-04-22 PROCEDURE — 94640 AIRWAY INHALATION TREATMENT: CPT

## 2025-04-22 PROCEDURE — 36415 COLL VENOUS BLD VENIPUNCTURE: CPT

## 2025-04-22 PROCEDURE — RXMED WILLOW AMBULATORY MEDICATION CHARGE

## 2025-04-22 PROCEDURE — 85025 COMPLETE CBC W/AUTO DIFF WBC: CPT

## 2025-04-22 PROCEDURE — G0378 HOSPITAL OBSERVATION PER HR: HCPCS

## 2025-04-22 PROCEDURE — 85610 PROTHROMBIN TIME: CPT

## 2025-04-22 PROCEDURE — 83735 ASSAY OF MAGNESIUM: CPT

## 2025-04-22 RX ORDER — PROCHLORPERAZINE 25 MG/1
25 SUPPOSITORY RECTAL EVERY 12 HOURS PRN
Status: DISCONTINUED | OUTPATIENT
Start: 2025-04-22 | End: 2025-04-22 | Stop reason: HOSPADM

## 2025-04-22 RX ORDER — DAPAGLIFLOZIN 5 MG/1
5 TABLET, FILM COATED ORAL DAILY
Status: DISCONTINUED | OUTPATIENT
Start: 2025-04-23 | End: 2025-04-22 | Stop reason: HOSPADM

## 2025-04-22 RX ORDER — CARVEDILOL 6.25 MG/1
3.12 TABLET ORAL
Qty: 30 TABLET | Refills: 1 | Status: SHIPPED | OUTPATIENT
Start: 2025-04-22 | End: 2025-06-21

## 2025-04-22 RX ORDER — PROCHLORPERAZINE EDISYLATE 5 MG/ML
10 INJECTION INTRAMUSCULAR; INTRAVENOUS EVERY 6 HOURS PRN
Status: DISCONTINUED | OUTPATIENT
Start: 2025-04-22 | End: 2025-04-22 | Stop reason: HOSPADM

## 2025-04-22 RX ORDER — FERROUS SULFATE 325(65) MG
325 TABLET ORAL
Qty: 30 TABLET | Refills: 2 | Status: SHIPPED | OUTPATIENT
Start: 2025-04-22 | End: 2025-07-21

## 2025-04-22 RX ORDER — PROCHLORPERAZINE MALEATE 10 MG
10 TABLET ORAL EVERY 6 HOURS PRN
Status: DISCONTINUED | OUTPATIENT
Start: 2025-04-22 | End: 2025-04-22 | Stop reason: HOSPADM

## 2025-04-22 RX ADMIN — MOMETASONE FUROATE 1 PUFF: 220 INHALANT RESPIRATORY (INHALATION) at 10:10

## 2025-04-22 ASSESSMENT — PAIN SCALES - GENERAL
PAINLEVEL_OUTOF10: 0 - NO PAIN
PAINLEVEL_OUTOF10: 0 - NO PAIN

## 2025-04-22 ASSESSMENT — COGNITIVE AND FUNCTIONAL STATUS - GENERAL
WALKING IN HOSPITAL ROOM: A LITTLE
CLIMB 3 TO 5 STEPS WITH RAILING: A LOT
TOILETING: A LITTLE
HELP NEEDED FOR BATHING: A LITTLE
PERSONAL GROOMING: A LITTLE
DAILY ACTIVITIY SCORE: 21
MOBILITY SCORE: 21

## 2025-04-22 ASSESSMENT — ACTIVITIES OF DAILY LIVING (ADL): LACK_OF_TRANSPORTATION: YES

## 2025-04-22 ASSESSMENT — PAIN SCALES - WONG BAKER: WONGBAKER_NUMERICALRESPONSE: NO HURT

## 2025-04-22 NOTE — CARE PLAN
The patient's goals for the shift include      The clinical goals for the shift include pt to remain free from falls/injuuries throughout the entire shift

## 2025-04-22 NOTE — HOSPITAL COURSE
"69 y.o F w/PMHx HFrEF (EF 40-45 3/2025), non-obstructive CAD s/p PCI to LCx (2019, cath in 8/2023), NSVT and VT s/p CRT (2019), anti-phospholipid ab syndrome c/b DVT/PE s/p [place] filter (on coumadin), hypothyroidism, PALAK (on CPAP), CKD (b/l Cr 1.3-1.5), COPD, T2DM (A1C 7.5 6/2024) who presented to ED after 1 episode of large volume BRBPR. On admission, the patient was hemodynamically stable and afebrile. Labs notable for Hb 11 which is the patient's baseline. GI was consulted who recommended EGD and colonoscopy, so prep was started. The following morning, the patient stated that she hadn't had any bloody bowel movements overnight. AM hemoglobin was stable at 10.4. She stated, \"I would rather bleed to death at home then do something that is being forced on me.\" She elected to be discharged home and follow-up with GI outpatient, rather than stay for prep inpatient. GI recommended discharged with PO iron supplementation and repeat iron labs in 3 months. They put in an outpatient referral for EGD and colonoscopy for patient to schedule.     # BRBPR  # Iron deficiency anemia  Hb 11.6 on admission, baseline 10-11. CTAP w/o acute bleed. Patient has never had prior C-scope as she has had significant nausea with prep in the past.   - Daily CBC to monitor Hb, goal Hb >7  - GI consulted, recommended inpatient EGD and colonoscopy, however patient elected to discharge and follow-up outpatient  - PO iron prescribed on discharge     # HFrEF  # CAD s/p PCI to LCx (2019)  Recently saw cardiology 4/15, increased coreg to 6.25 BID due to elevated BP. Patient's orthostatics were positive on admission, she may need coreg decreased back to 3.125mg BID.  - BB: decreased carvedilol to 3.125mg BID until outpatient follow-up  - ACEi/ARB/ARNI: continued home Entresto 49-51mg BID  - SGLT2i: continued home dapagliflozin  - MRA: not started due to CKD  - Continue aspirin, atorvastatin     # Stage 3 CKD - Cr at baseline on admission " (1.3-1.5)  # Prior VT and VFib s/p ICD - continued home amio 100   # APS c/b DVT/PE s/p IVC filter - held warfarin initially, restarted on discharge  # T2DM - restarted home glipizide, lantus 22U on discharge  # Hypothyroidism - not on thyroid replacement, follow-up with PCP  # PALAK (on CPAP) - continue home CPAP  # COPD - continued home inhalers

## 2025-04-22 NOTE — PROGRESS NOTES
[facility]  [facility]  CONSULT FOLLOW-UP         SUBJECTIVE     Reason for Consult: BRBPR    Interval Events/Subjective:   - No acute events overnight  - Patient refusing bowel prep, declining EGD/colonoscopy at this time; amenable to outpatient follow-up  - 2 BMs overnight, no red blood or melena  - Requesting diet    ROS: Complete ROS obtained, negative unless otherwise indicated above.     Medications:  Scheduled Medications[1]  PRN Medications[2]      EXAM     Physical Exam   Vitals:    04/21/25 2253 04/22/25 0126 04/22/25 0545 04/22/25 0819   BP: 122/61 125/83 125/58 126/60   BP Location: Left arm Left arm     Pulse: 75 72 67 65   Resp: 18 18 18 16   Temp: 36.7 °C (98.1 °F) 36 °C (96.8 °F) 36.6 °C (97.9 °F) 36.8 °C (98.2 °F)   TempSrc: Temporal Temporal     SpO2: 92% 92% (!) 88% 98%   Weight:       Height:           General: Sitting on edge of be in NAD, AA&O x 3  Eyes: EOMI, anicteric sclera  ENT: MMM  Heart: Regular rate  Lungs: No respiratory distress on 1 L NC  Neuro: Appropriately responds to questions/commands         DATA                                                                            Labs     Results from last 7 days   Lab Units 04/22/25  0612 04/21/25  1619 04/21/25  1549 04/21/25  0416   WBC AUTO x10*3/uL 6.4 6.4 6.3 6.4   HEMOGLOBIN g/dL 10.4* 10.7* 11.0* 11.6*   HEMATOCRIT % 34.9* 33.9* 35.7* 35.6*   PLATELETS AUTO x10*3/uL 200 192 194 195   NEUTROS PCT AUTO % 48.6  --  50.7 53.9   LYMPHS PCT AUTO % 39.8  --  36.3 34.1   MONOS PCT AUTO % 8.1  --  9.3 7.6   EOS PCT AUTO % 2.7  --  3.0 3.4     Results from last 7 days   Lab Units 04/22/25  0612 04/21/25  0416   APTT seconds 38* 39*   INR  1.9* 2.2*     Results from last 7 days   Lab Units 04/22/25  0612 04/21/25  0416   SODIUM mmol/L 139 138   POTASSIUM mmol/L 4.2 4.2   CHLORIDE mmol/L 105 105   CO2 mmol/L 27 28   BUN mg/dL 18 12   CREATININE mg/dL 1.47* 1.51*   CALCIUM mg/dL 7.7* 7.9*    PROTEIN TOTAL g/dL  --  6.1*   BILIRUBIN TOTAL mg/dL  --  0.4   ALK PHOS U/L  --  60   ALT U/L  --  5*   AST U/L  --  15   GLUCOSE mg/dL 68* 137*     Results from last 7 days   Lab Units 04/21/25  0416   BILIRUBIN TOTAL mg/dL 0.4                                                                                    Imaging          === 04/21/25 ===     CT ABDOMEN PELVIS WO IV CONTRAST     - Impression -  1.  No acute process within the abdomen/pelvis.  2. Ventral transverse colon containing abdominal wall hernia with  relatively narrow neck, but no evidence of obstruction/strangulation.  3. Other chronic findings as above.     I personally reviewed the images/study and I agree with the findings  as stated by Dr. Lanre Conway. This study was interpreted at  [place], [place].     MACRO:  None     Signed by: Lopez Johnson 4/21/2025 5:34 AM  Dictation workstation:   [id number]                                                                           GI Procedures   None      ASSESSMENT / PLAN     Assessment and Recommendations:   Ms. Ravi is a 69-year-old female with a history of antiphospholipid syndrome [on warfarin, goal INR 2-3] c/b DVT/PE s/p IVC filter, CAD s/p PCI (2019), CKD, HFrEF (LVEF 40-45% 04/07/2025), mod-to-severe MR, NVST/VT, PALAK, and T2DM who presents with acute BRBPR. Patient has remained hemodynamically stable with down-trend in hgb from 11.6 to 10.4 (baseline ~11). Labs otherwise notable for iron deficiency anemia (ferritin 22/iron 47/TIBC 259/%sat 18). Positive orthostatics on presentation were likely erroneous given patient is asymptomatic and labs/hemodynamics do not indicate a significant a significant volume of blood loss.     Ddx includes diverticular bleed, AVMs, ulceration, ischemic colitis, infectious colitis, malignancy, hemorrhoids, Dieulafoy lesion/brisk UGIB, etc. For acute, hemodynamically significant, active LGIB, etiology is more likely  vascular such as diverticular bleed, aortoenteric fistula, or rapid UGIB (note that 10-15% of rapid UGIB can present as hematochezia with hemodynamic instability). Less likely are entities such as ischemic, inflammatory, AVMs, or colonic mass, as these typically do not present with massive bleeding.    Indications and recommendations for scope thoroughly discussed with patient; patient demonstrates understanding and continues to refuse inpatient bowel prep and scope.    - Outpatient EGD/colonoscopy ordered, patient to schedule  - Please discharge on PO ferrous sulfate or ferrous gluconate 3x/weekly for 3-months with PCP follow-up    QUINCY per primary team     ------------------------------------------------------------------------  Ashlyn Baron MD  Gastroenterology PGY1    The above recommendations were communicated to the primary team.    Patient was discussed with fellow, Dr. Crespo, and attending, Dr. Reddy.    Thank you for the consultation. GI will sign off.  During weekday hours of 7am-5pm, please do not hesitate to contact me on [employer] Chat  or page [phone], if there are any further questions.  After hours, on weekends, and on holidays, please page the on-call GI fellow at [phone].  Thank you           [1] [Held by provider] amiodarone, 100 mg, oral, Daily  aspirin, 81 mg, oral, Daily  atorvastatin, 40 mg, oral, Daily  [Held by provider] carvedilol, 6.25 mg, oral, BID  [Held by provider] dapagliflozin propanediol, 5 mg, oral, Daily  [Held by provider] furosemide, 20 mg, oral, Daily before breakfast  insulin glargine, 11 Units, subcutaneous, Nightly  mometasone, 1 puff, inhalation, BID  pantoprazole, 40 mg, oral, Daily before breakfast  polyethylene glycol-electrolytes, 4,000 mL, oral, Once  [Held by provider] sacubitriL-valsartan, 1 tablet, oral, BID  [Held by provider] warfarin, 3.75 mg, oral, Once per day on Sunday Tuesday Wednesday Thursday Friday Saturday  [Held by provider] warfarin, 7.5 mg, oral, Every Monday  [2]  PRN medications: albuterol, ondansetron, polyethylene glycol, polyethylene glycol-electrolytes, prochlorperazine **OR** prochlorperazine **OR** prochlorperazine

## 2025-04-22 NOTE — CARE PLAN
The patient's goals for the shift include      The clinical goals for the shift include patient will remain HDS by discharge      Problem: Chronic Conditions and Co-morbidities  Goal: Patient's chronic conditions and co-morbidity symptoms are monitored and maintained or improved  Outcome: Progressing     Problem: Safety - Adult  Goal: Free from fall injury  Outcome: Progressing     Problem: Fall/Injury  Goal: Not fall by end of shift  Outcome: Progressing  Goal: Be free from injury by end of the shift  Outcome: Progressing

## 2025-04-22 NOTE — PROGRESS NOTES
04/22/25 1052   Discharge Planning   Living Arrangements Alone   Support Systems Family members   Assistance Needed uses a cane for ambulation assistance   Type of Residence Private residence   Home or Post Acute Services None   Expected Discharge Disposition Home   Does the patient need discharge transport arranged? Yes   RoundTrip coordination needed? Yes   What day is the transport expected? 04/22/25   Financial Resource Strain   How hard is it for you to pay for the very basics like food, housing, medical care, and heating? Hard  (requesting resources for rent, utilities, and food)   Housing Stability   In the last 12 months, was there a time when you were not able to pay the mortgage or rent on time? N   In the past 12 months, how many times have you moved where you were living? 0   At any time in the past 12 months, were you homeless or living in a shelter (including now)? N   Transportation Needs   In the past 12 months, has lack of transportation kept you from medical appointments or from getting medications? yes   In the past 12 months, has lack of transportation kept you from meetings, work, or from getting things needed for daily living? Yes       Transitional Care Coordination Progress Note: 4/22/25  Patient discussed during interdisciplinary rounds.   Team members present: MD and TCC  Plan per Medical/Surgical team: Patient admitted for suspicion of lower GI bleed. GI consulted. Recommended an EGD and colonoscopy but patient refused. MD will place outpatient follow up orders. Patient will discharge to home today.  Payor: [facility]  Discharge disposition: home  Potential Barriers: none  ADOD:  4/22/25    Demographics/Insurance: verified  Living Environment: Patient lives alone but has support from family.     PCP: Carter Bautista  Pharmacy: [facility]  DME: cane  Falls: denies  Dialysis: denies  Social Work Needs: Will consult CHW for food, rent, and utility assistance.  Transportation at discharge: Will  contact patient insurance for transportation home. At 1450, contacted patient insurance and she does not have transportation benefits. Patient will discharge to home via Ly[employer].      Guerline Domínguez RN, BSN  Transitional Care Coordinator

## 2025-04-22 NOTE — NURSING NOTE
Discharge note: patient discharge home . Iv removed. Discharge instructions reviewed with patient. Patient belongings with patient .

## 2025-04-22 NOTE — DISCHARGE SUMMARY
"Discharge Diagnosis  Bright red blood per rectum  Iron deficiency anemia  HFrEF  CAD s/p PCI to LCx (2019)  Stage 3 CKD  Prior VT and VFib s/p ICD   APS c/b DVT/PE s/p IVC filter   T2DM   Hypothyroidism  PALAK (on CPAP)   COPD      Issues Requiring Follow-Up  [ ] GI referred patient for outpatient EGD and colonoscopy as an outpatient to evaluate her BRBPR and iron deficiency anemia. Please ensure this is scheduled.  [ ] Discharged with PO iron daily, please re-check iron labs after 3 months.  [ ] Patient's carvedilol decreased to 3.125mg BID on discharge given bleed and orthostasis on admission. Uptitrate as indicated on follow-up.    Test Results Pending At Discharge  Pending Labs       No current pending labs.            Hospital Course  69 y.o F w/PMHx HFrEF (EF 40-45 3/2025), non-obstructive CAD s/p PCI to LCx (2019, cath in 8/2023), NSVT and VT s/p CRT (2019), anti-phospholipid ab syndrome c/b DVT/PE s/p Beverly filter (on coumadin), hypothyroidism, PALAK (on CPAP), CKD (b/l Cr 1.3-1.5), COPD, T2DM (A1C 7.5 6/2024) who presented to ED after 1 episode of large volume BRBPR. On admission, the patient was hemodynamically stable and afebrile. Labs notable for Hb 11 which is the patient's baseline. GI was consulted who recommended EGD and colonoscopy, so prep was started. The following morning, the patient stated that she hadn't had any bloody bowel movements overnight. AM hemoglobin was stable at 10.4. She stated, \"I would rather bleed to death at home then do something that is being forced on me.\" She elected to be discharged home and follow-up with GI outpatient, rather than stay for prep inpatient. GI recommended discharged with PO iron supplementation and repeat iron labs in 3 months. They put in an outpatient referral for EGD and colonoscopy for patient to schedule.     # BRBPR  # Iron deficiency anemia  Hb 11.6 on admission, baseline 10-11. CTAP w/o acute bleed. Patient has never had prior C-scope as she has " had significant nausea with prep in the past.   - Daily CBC to monitor Hb, goal Hb >7  - GI consulted, recommended inpatient EGD and colonoscopy, however patient elected to discharge and follow-up outpatient  - PO iron prescribed on discharge     # HFrEF  # CAD s/p PCI to LCx (2019)  Recently saw cardiology 4/15, increased coreg to 6.25 BID due to elevated BP. Patient's orthostatics were positive on admission, she may need coreg decreased back to 3.125mg BID.  - BB: decreased carvedilol to 3.125mg BID until outpatient follow-up  - ACEi/ARB/ARNI: continued home Entresto 49-51mg BID  - SGLT2i: continued home dapagliflozin  - MRA: not started due to CKD  - Continue aspirin, atorvastatin     # Stage 3 CKD - Cr at baseline on admission (1.3-1.5)  # Prior VT and VFib s/p ICD - continued home amio 100   # APS c/b DVT/PE s/p IVC filter - held warfarin initially, restarted on discharge  # T2DM - restarted home glipizide, lantus 22U on discharge  # Hypothyroidism - not on thyroid replacement, follow-up with PCP  # PALAK (on CPAP) - continue home CPAP  # COPD - continued home inhalers    Pertinent Physical Exam At Time of Discharge  GEN: well-appearing, no acute distress  HEENT: moist mucous membranes, no scleral icterus  NECK: Supple  CV: RRR  PULM: Breathing comfortably  AB: Soft, non-tender, non-distended  : No in-dwelling mccullough  MSK: No lower extremity edema bilaterally  NEURO: A&Ox3, following commands, conversational    Home Medications     Medication List      START taking these medications     FeroSuL 325 mg (65 mg iron) tablet; Generic drug: ferrous sulfate; Take   1 tablet (325 mg) by mouth once daily with breakfast.     CHANGE how you take these medications     carvedilol 6.25 mg tablet; Commonly known as: Coreg; Take 0.5 tablets   (3.125 mg) by mouth 2 times daily (morning and late afternoon).; What   changed: how much to take     CONTINUE taking these medications     albuterol 2.5 mg /3 mL (0.083 %) nebulizer  solution   amiodarone 200 mg tablet; Commonly known as: Pacerone; Take 0.5 tablets   (100 mg) by mouth once daily.   aspirin 81 mg chewable tablet; Chew 1 tablet (81 mg) once daily.   atorvastatin 40 mg tablet; Commonly known as: Lipitor; Take 1 tablet (40   mg) by mouth once daily.   Farxiga 5 mg tablet; Generic drug: dapagliflozin propanediol; Take 1   tablet (5 mg) by mouth once daily.   Flovent  mcg/actuation inhaler; Generic drug: fluticasone   * furosemide 40 mg tablet; Commonly known as: Lasix; Take 1 tablet (40   mg) by mouth once daily.   * furosemide 20 mg tablet; Commonly known as: Lasix; take one tablet by   mouth every morning   glipiZIDE XL 10 mg 24 hr tablet; Commonly known as: Glucotrol XL; Take 1   tablet by mouth every day   Lantus Solostar U-100 Insulin 100 unit/mL (3 mL) pen; Generic drug:   insulin glargine; inject 22 units daily at bedtime   * sacubitriL-valsartan 49-51 mg tablet; Commonly known as: Entresto;   Take 1 tablet by mouth 2 times a day.   * Entresto 49-51 mg tablet; Generic drug: sacubitriL-valsartan; Take 1   tablet by mouth 2 times a day.   Victoza 2-Noah 0.6 mg/0.1 mL (18 mg/3 mL) injection; Generic drug:   liraglutide; Inject 1.8mg subcutaneously daily   * warfarin 7.5 mg tablet; Commonly known as: Coumadin; take one 1/2   tablet by mouth other than wed & saturday whole pill   * warfarin 7.5 mg tablet; Commonly known as: Coumadin; TAKE 1/2 TABLET   BY MOUTH ONCE DAILY EXCEPT FOR WEDNESDAYS AND SATURDAY TAKE A FULL TABLET  * This list has 6 medication(s) that are the same as other medications   prescribed for you. Read the directions carefully, and ask your doctor or   other care provider to review them with you.     STOP taking these medications     omeprazole 40 mg DR capsule; Commonly known as: PriLOSEC       Outpatient Follow-Up  Future Appointments   Date Time Provider Department Center   4/23/2025 10:00 AM [facility]   4/23/2025 10:30 AM [facility]       Airam Garrett MD        >30 minutes were spent on discharge coordination and planning.

## 2025-04-22 NOTE — ANESTHESIA PREPROCEDURE EVALUATION
Patient: Kenna Ravi    Procedure Information       Date/Time: 04/23/25 1000    Procedure: EGD    Location: [facility]            Relevant Problems   Cardiac   (+) Atypical chest pain   (+) Chest pain   (+) Chronic systolic congestive heart failure   (+) Coronary atherosclerosis of native coronary artery   (+) Essential hypertension   (+) Hyperlipidemia   (+) NSTEMI (non-ST elevated myocardial infarction) (Multi)   (+) Old myocardial infarction   (+) Presence of automatic (implantable) cardiac defibrillator   (+) Presence of stent in coronary artery   (+) Sustained SVT (CMS-HCC)   (+) Ventricular tachycardia (Multi)      Pulmonary   (+) Asthma   (+) COPD (chronic obstructive pulmonary disease) (Multi)      Neuro   (+) Benign intracranial hypertension   (+) Carpal tunnel syndrome      GI   (+) Rectal bleeding      Endocrine   (+) Hypothyroidism      Hematology   (+) Anemia   (+) Antiphospholipid antibody syndrome (Multi)   (+) Deep vein thrombosis of lower extremity      Musculoskeletal   (+) Carpal tunnel syndrome       Clinical information reviewed:                   NPO Detail:  No data recorded     PHYSICAL EXAM    Anesthesia Plan

## 2025-04-23 ENCOUNTER — APPOINTMENT (OUTPATIENT)
Dept: GASTROENTEROLOGY | Facility: HOSPITAL | Age: 70
End: 2025-04-23
Payer: MEDICARE

## 2025-04-23 ENCOUNTER — ANESTHESIA (OUTPATIENT)
Dept: GASTROENTEROLOGY | Facility: HOSPITAL | Age: 70
End: 2025-04-23
Payer: MEDICARE

## 2025-04-23 ENCOUNTER — DOCUMENTATION (OUTPATIENT)
Dept: CASE MANAGEMENT | Facility: HOSPITAL | Age: 70
End: 2025-04-23
Payer: MEDICARE

## 2025-04-23 PROCEDURE — RXMED WILLOW AMBULATORY MEDICATION CHARGE

## 2025-04-23 NOTE — PROGRESS NOTES
Patient referred by SW- pt request resources w/ Rent, Utilities and Transportation.  CHW spoke with patient - transportation request is/ was for auto repairs (no resources).  Rent / Utilities Assistance- [facility], [facility].  Food Resources w/ [facility] [employer] [facility].       Discussed the following topics on behalf of the patient:  []  Behavioral Health Assistance     []  Case Management  []   Assistance  []  Digital Equity Assistance  []  Dental Health Assistance  []  Education Assistance  []  Employment Assistance  [x]  Financial Strain Relief Assistance  [x]  Food Insecurity Assistance  []  Healthcare Coverage Assistance  [x]  Housing Stability Assistance  []  IP Violence Relief Assistance  []  Legal Assistance  []  Physical Activity Assistance  [x]  Social Connection Assistance  [x]  Stress Relief Assistance   []  Substance Abuse Assistance  [x]  Transportation Assistance  []  Utility Assistance  []  Other: [insert comment here]    Next Steps:         Lukas Pepe MA

## 2025-04-24 ENCOUNTER — PHARMACY VISIT (OUTPATIENT)
Dept: PHARMACY | Facility: CLINIC | Age: 70
End: 2025-04-24
Payer: COMMERCIAL

## 2025-04-25 ENCOUNTER — HOSPITAL ENCOUNTER (OUTPATIENT)
Dept: CARDIOLOGY | Facility: CLINIC | Age: 70
Discharge: HOME | End: 2025-04-25
Payer: MEDICARE

## 2025-04-25 DIAGNOSIS — I42.0 DILATED CARDIOMYOPATHY (MULTI): ICD-10-CM

## 2025-04-30 PROCEDURE — RXMED WILLOW AMBULATORY MEDICATION CHARGE

## 2025-05-02 ENCOUNTER — PHARMACY VISIT (OUTPATIENT)
Dept: PHARMACY | Facility: CLINIC | Age: 70
End: 2025-05-02
Payer: COMMERCIAL

## 2025-05-12 ENCOUNTER — HOSPITAL ENCOUNTER (OUTPATIENT)
Dept: CARDIOLOGY | Facility: CLINIC | Age: 70
Discharge: HOME | End: 2025-05-12
Payer: MEDICARE

## 2025-05-12 DIAGNOSIS — I42.0 DILATED CARDIOMYOPATHY (MULTI): ICD-10-CM

## 2025-05-12 DIAGNOSIS — Z95.810 PRESENCE OF AUTOMATIC (IMPLANTABLE) CARDIAC DEFIBRILLATOR: ICD-10-CM

## 2025-05-12 PROCEDURE — 93296 REM INTERROG EVL PM/IDS: CPT

## 2025-05-12 PROCEDURE — RXMED WILLOW AMBULATORY MEDICATION CHARGE

## 2025-05-12 PROCEDURE — 93295 DEV INTERROG REMOTE 1/2/MLT: CPT | Performed by: INTERNAL MEDICINE

## 2025-05-13 ENCOUNTER — PHARMACY VISIT (OUTPATIENT)
Dept: PHARMACY | Facility: CLINIC | Age: 70
End: 2025-05-13
Payer: COMMERCIAL

## 2025-05-21 PROCEDURE — RXMED WILLOW AMBULATORY MEDICATION CHARGE

## 2025-05-22 ENCOUNTER — PHARMACY VISIT (OUTPATIENT)
Dept: PHARMACY | Facility: CLINIC | Age: 70
End: 2025-05-22
Payer: COMMERCIAL

## 2025-05-26 PROCEDURE — RXMED WILLOW AMBULATORY MEDICATION CHARGE

## 2025-05-28 PROCEDURE — RXMED WILLOW AMBULATORY MEDICATION CHARGE

## 2025-05-29 ENCOUNTER — PHARMACY VISIT (OUTPATIENT)
Dept: PHARMACY | Facility: CLINIC | Age: 70
End: 2025-05-29
Payer: COMMERCIAL

## 2025-05-29 DIAGNOSIS — E13.69 OTHER SPECIFIED DIABETES MELLITUS WITH OTHER SPECIFIED COMPLICATION, UNSPECIFIED WHETHER LONG TERM INSULIN USE (MULTI): ICD-10-CM

## 2025-05-29 DIAGNOSIS — I50.22 CHRONIC SYSTOLIC CONGESTIVE HEART FAILURE: ICD-10-CM

## 2025-05-29 PROCEDURE — RXMED WILLOW AMBULATORY MEDICATION CHARGE

## 2025-05-29 RX ORDER — GLIPIZIDE 10 MG/1
TABLET, FILM COATED, EXTENDED RELEASE ORAL
Qty: 90 TABLET | Refills: 0 | Status: SHIPPED | OUTPATIENT
Start: 2025-05-29 | End: 2025-08-27

## 2025-06-02 ENCOUNTER — PHARMACY VISIT (OUTPATIENT)
Dept: PHARMACY | Facility: CLINIC | Age: 70
End: 2025-06-02
Payer: COMMERCIAL

## 2025-06-07 DIAGNOSIS — I50.22 CHRONIC SYSTOLIC HEART FAILURE: ICD-10-CM

## 2025-06-09 PROCEDURE — RXMED WILLOW AMBULATORY MEDICATION CHARGE

## 2025-06-09 RX ORDER — SACUBITRIL AND VALSARTAN 49; 51 MG/1; MG/1
1 TABLET, FILM COATED ORAL 2 TIMES DAILY
Qty: 60 TABLET | Refills: 5 | Status: SHIPPED | OUTPATIENT
Start: 2025-06-09 | End: 2025-07-12

## 2025-06-11 PROCEDURE — RXMED WILLOW AMBULATORY MEDICATION CHARGE

## 2025-06-12 ENCOUNTER — PHARMACY VISIT (OUTPATIENT)
Dept: PHARMACY | Facility: CLINIC | Age: 70
End: 2025-06-12
Payer: COMMERCIAL

## 2025-06-16 ENCOUNTER — HOSPITAL ENCOUNTER (OUTPATIENT)
Dept: CARDIOLOGY | Facility: CLINIC | Age: 70
Discharge: HOME | End: 2025-06-16
Payer: MEDICARE

## 2025-06-16 DIAGNOSIS — Z95.810 PRESENCE OF AUTOMATIC (IMPLANTABLE) CARDIAC DEFIBRILLATOR: ICD-10-CM

## 2025-06-16 DIAGNOSIS — I42.0 DILATED CARDIOMYOPATHY (MULTI): ICD-10-CM

## 2025-06-16 PROCEDURE — RXMED WILLOW AMBULATORY MEDICATION CHARGE

## 2025-06-20 ENCOUNTER — PHARMACY VISIT (OUTPATIENT)
Dept: PHARMACY | Facility: CLINIC | Age: 70
End: 2025-06-20
Payer: COMMERCIAL

## 2025-06-23 ENCOUNTER — HOSPITAL ENCOUNTER (OUTPATIENT)
Dept: CARDIOLOGY | Facility: CLINIC | Age: 70
Discharge: HOME | End: 2025-06-23
Payer: MEDICARE

## 2025-06-23 DIAGNOSIS — Z95.810 PRESENCE OF AUTOMATIC (IMPLANTABLE) CARDIAC DEFIBRILLATOR: ICD-10-CM

## 2025-06-23 DIAGNOSIS — I42.0 DILATED CARDIOMYOPATHY (MULTI): ICD-10-CM

## 2025-06-24 PROCEDURE — RXMED WILLOW AMBULATORY MEDICATION CHARGE

## 2025-06-25 PROCEDURE — RXMED WILLOW AMBULATORY MEDICATION CHARGE

## 2025-06-26 ENCOUNTER — PHARMACY VISIT (OUTPATIENT)
Dept: PHARMACY | Facility: CLINIC | Age: 70
End: 2025-06-26
Payer: COMMERCIAL

## 2025-06-26 ENCOUNTER — TELEPHONE (OUTPATIENT)
Dept: CARDIOLOGY | Facility: HOSPITAL | Age: 70
End: 2025-06-26
Payer: MEDICARE

## 2025-07-07 PROCEDURE — RXMED WILLOW AMBULATORY MEDICATION CHARGE

## 2025-07-08 ENCOUNTER — PHARMACY VISIT (OUTPATIENT)
Dept: PHARMACY | Facility: CLINIC | Age: 70
End: 2025-07-08
Payer: COMMERCIAL

## 2025-07-16 PROCEDURE — RXMED WILLOW AMBULATORY MEDICATION CHARGE

## 2025-07-17 ENCOUNTER — PHARMACY VISIT (OUTPATIENT)
Dept: PHARMACY | Facility: CLINIC | Age: 70
End: 2025-07-17
Payer: COMMERCIAL

## 2025-07-21 ENCOUNTER — HOSPITAL ENCOUNTER (OUTPATIENT)
Dept: CARDIOLOGY | Facility: CLINIC | Age: 70
Discharge: HOME | End: 2025-07-21
Payer: MEDICARE

## 2025-07-21 DIAGNOSIS — I42.0 DILATED CARDIOMYOPATHY (MULTI): ICD-10-CM

## 2025-07-21 DIAGNOSIS — Z95.810 PRESENCE OF AUTOMATIC (IMPLANTABLE) CARDIAC DEFIBRILLATOR: ICD-10-CM

## 2025-07-22 ENCOUNTER — TELEPHONE (OUTPATIENT)
Dept: CARDIOLOGY | Facility: HOSPITAL | Age: 70
End: 2025-07-22
Payer: MEDICARE

## 2025-07-22 NOTE — TELEPHONE ENCOUNTER
Call placed to Ms. Ravi who shared that she chose to move to the [facility] because the others had longer wait lists.  She recognizes that this will be closer to her friends, family, medical providers, and within county should she need to use [employer].  She has a niece and sister that have been helping her pack.  Their goal is for her to be settled in the new apartment by 8/1/2025.  Encouraged to reach out if she has questions or needs any assistance.

## 2025-07-24 PROCEDURE — RXMED WILLOW AMBULATORY MEDICATION CHARGE

## 2025-07-29 ENCOUNTER — PHARMACY VISIT (OUTPATIENT)
Dept: PHARMACY | Facility: CLINIC | Age: 70
End: 2025-07-29
Payer: COMMERCIAL

## 2025-08-12 PROCEDURE — RXMED WILLOW AMBULATORY MEDICATION CHARGE

## 2025-08-13 ENCOUNTER — PHARMACY VISIT (OUTPATIENT)
Dept: PHARMACY | Facility: CLINIC | Age: 70
End: 2025-08-13
Payer: COMMERCIAL

## 2025-08-13 PROCEDURE — RXMED WILLOW AMBULATORY MEDICATION CHARGE

## 2025-08-14 ENCOUNTER — PHARMACY VISIT (OUTPATIENT)
Dept: PHARMACY | Facility: CLINIC | Age: 70
End: 2025-08-14
Payer: COMMERCIAL

## 2025-08-21 ENCOUNTER — TELEPHONE (OUTPATIENT)
Dept: CARDIOLOGY | Facility: HOSPITAL | Age: 70
End: 2025-08-21
Payer: MEDICARE

## 2025-08-22 ENCOUNTER — PHARMACY VISIT (OUTPATIENT)
Dept: PHARMACY | Facility: CLINIC | Age: 70
End: 2025-08-22
Payer: COMMERCIAL

## 2025-08-22 PROCEDURE — RXMED WILLOW AMBULATORY MEDICATION CHARGE

## 2025-08-25 ENCOUNTER — HOSPITAL ENCOUNTER (OUTPATIENT)
Dept: CARDIOLOGY | Facility: CLINIC | Age: 70
Discharge: HOME | End: 2025-08-25
Payer: MEDICARE

## 2025-08-25 DIAGNOSIS — I42.0 DILATED CARDIOMYOPATHY (MULTI): ICD-10-CM

## 2025-08-25 DIAGNOSIS — Z95.810 PRESENCE OF AUTOMATIC (IMPLANTABLE) CARDIAC DEFIBRILLATOR: ICD-10-CM

## 2025-08-27 ENCOUNTER — TELEPHONE (OUTPATIENT)
Dept: CARDIOLOGY | Facility: HOSPITAL | Age: 70
End: 2025-08-27
Payer: MEDICARE

## 2025-08-27 DIAGNOSIS — E13.69 OTHER SPECIFIED DIABETES MELLITUS WITH OTHER SPECIFIED COMPLICATION, UNSPECIFIED WHETHER LONG TERM INSULIN USE (MULTI): ICD-10-CM

## 2025-08-27 DIAGNOSIS — I50.22 CHRONIC SYSTOLIC CONGESTIVE HEART FAILURE: ICD-10-CM

## 2025-08-27 RX ORDER — GLIPIZIDE 10 MG/1
TABLET, FILM COATED, EXTENDED RELEASE ORAL
Qty: 90 TABLET | Refills: 0 | OUTPATIENT
Start: 2025-08-27 | End: 2025-11-25

## 2025-09-02 PROCEDURE — RXMED WILLOW AMBULATORY MEDICATION CHARGE

## 2025-09-03 ENCOUNTER — TELEPHONE (OUTPATIENT)
Dept: CARDIOLOGY | Facility: HOSPITAL | Age: 70
End: 2025-09-03
Payer: MEDICARE

## 2025-09-04 ENCOUNTER — PHARMACY VISIT (OUTPATIENT)
Dept: PHARMACY | Facility: CLINIC | Age: 70
End: 2025-09-04
Payer: COMMERCIAL